# Patient Record
Sex: FEMALE | Race: BLACK OR AFRICAN AMERICAN | NOT HISPANIC OR LATINO | Employment: FULL TIME | URBAN - METROPOLITAN AREA
[De-identification: names, ages, dates, MRNs, and addresses within clinical notes are randomized per-mention and may not be internally consistent; named-entity substitution may affect disease eponyms.]

---

## 2018-08-08 ENCOUNTER — OFFICE VISIT (OUTPATIENT)
Dept: OBSTETRICS AND GYNECOLOGY | Facility: CLINIC | Age: 28
End: 2018-08-08
Payer: COMMERCIAL

## 2018-08-08 VITALS
DIASTOLIC BLOOD PRESSURE: 78 MMHG | BODY MASS INDEX: 20.66 KG/M2 | SYSTOLIC BLOOD PRESSURE: 121 MMHG | HEIGHT: 64 IN | WEIGHT: 121 LBS

## 2018-08-08 DIAGNOSIS — Z11.3 SCREEN FOR STD (SEXUALLY TRANSMITTED DISEASE): ICD-10-CM

## 2018-08-08 DIAGNOSIS — Z01.419 ENCOUNTER FOR WELL WOMAN EXAM WITH ROUTINE GYNECOLOGICAL EXAM: Primary | ICD-10-CM

## 2018-08-08 DIAGNOSIS — Z80.3 FAMILY HISTORY OF BREAST CANCER IN FEMALE: ICD-10-CM

## 2018-08-08 DIAGNOSIS — Z01.411 ENCOUNTER FOR WELL WOMAN EXAM WITH ABNORMAL FINDINGS: ICD-10-CM

## 2018-08-08 DIAGNOSIS — Z12.4 ENCOUNTER FOR SCREENING FOR CERVICAL CANCER: ICD-10-CM

## 2018-08-08 PROCEDURE — 87491 CHLMYD TRACH DNA AMP PROBE: CPT

## 2018-08-08 PROCEDURE — 99385 PREV VISIT NEW AGE 18-39: CPT | Mod: S$GLB,,, | Performed by: OBSTETRICS & GYNECOLOGY

## 2018-08-08 PROCEDURE — 87660 TRICHOMONAS VAGIN DIR PROBE: CPT

## 2018-08-08 PROCEDURE — 88175 CYTOPATH C/V AUTO FLUID REDO: CPT

## 2018-08-08 PROCEDURE — 99999 PR PBB SHADOW E&M-NEW PATIENT-LVL III: CPT | Mod: PBBFAC,,, | Performed by: OBSTETRICS & GYNECOLOGY

## 2018-08-08 NOTE — PROGRESS NOTES
SUBJECTIVE:   Karlee Bennett is a 28 y.o. female   for annual well woman exam. Patient's last menstrual period was 07/15/2018 (exact date)..    Pt has a few concerns:  -pt with multiple paternal aunt/uncle dies at age 30s due to SCD complications  -has two maternal aunts with breast cancer @ age 45, not sure if genetic testing was done  -tried to conceive for one month, but relationship was ended. Has questions regarding fertility testing and eggs freezing. She is not currently in a relationship    Contraception: none    History reviewed. No pertinent past medical history.  History reviewed. No pertinent surgical history.  Social History     Social History    Marital status: Single     Spouse name: N/A    Number of children: N/A    Years of education: N/A     Occupational History    Not on file.     Social History Main Topics    Smoking status: Never Smoker    Smokeless tobacco: Never Used    Alcohol use No    Drug use: No    Sexual activity: Yes     Partners: Male     Other Topics Concern    Not on file     Social History Narrative    No narrative on file     Family History   Problem Relation Age of Onset    Breast cancer Maternal Aunt 45    Colon cancer Maternal Grandfather 55    Breast cancer Maternal Aunt 45     OB History    Para Term  AB Living   0 0 0 0 0 0   SAB TAB Ectopic Multiple Live Births   0 0 0 0 0         Obstetric Comments   14/Reg   Denies std   Denies abnl pap         No current outpatient prescriptions on file.     No current facility-administered medications for this visit.      Allergies: Tetracyclines       ROS:  GENERAL: Denies weight gain or weight loss. Feeling well overall.   SKIN: Denies rash or lesions.   HEAD: Denies head injury or headache.   NODES: Denies enlarged lymph nodes.   CHEST: Denies chest pain or shortness of breath.   CARDIOVASCULAR: Denies palpitations or left sided chest pain.   ABDOMEN: No abdominal pain, constipation, diarrhea, nausea,  "vomiting or rectal bleeding.   URINARY: No frequency, dysuria, hematuria, or burning on urination.  REPRODUCTIVE: Denies vaginal discharge, abnormal vaginal bleeding, lesions, pelvic pain  BREASTS: The patient performs breast self-examination and denies pain, lumps, or nipple discharge.   HEMATOLOGIC: No easy bruisability or excessive bleeding.  MUSCULOSKELETAL: Denies joint pain or swelling.   NEUROLOGIC: Denies syncope or weakness.   PSYCHIATRIC: Denies depression, anxiety or mood swings.      OBJECTIVE:   /78   Ht 5' 4" (1.626 m)   Wt 54.9 kg (121 lb)   LMP 07/15/2018 (Exact Date)   BMI 20.77 kg/m²   The patient appears well, alert, oriented x 3, in no distress.  PSYCH:  Normal, full range of affect  NECK: negative, no thyromegaly, trachea midline  SKIN: normal, good color, good turgor and no acne, striae, hirsutism  BREAST EXAM: breasts appear normal, no suspicious masses, no skin or nipple changes or axillary nodes  ABDOMEN: soft, non-tender; bowel sounds normal; no masses,  no organomegaly and no hernias, masses, or hepatosplenomegaly  GENITALIA: normal external genitalia, no erythema, no discharge  BLADDER: soft  URETHRA: normal appearing urethra with no masses, tenderness or lesions and normal urethra, normal urethral meatus  VAGINA: Normal  CERVIX: no lesions or cervical motion tenderness  UTERUS: normal size, contour, position, consistency, mobility, non-tender  ADNEXA: no mass, fullness, tenderness      ASSESSMENT:   1. Health maintenance  -pap done. Discussed ASCCP guideline screening every 3 - 5 years.   -screening MMG recommend @ age 35 due to FH of breast cancer  -counseled on exercise and healthy diet  -bone health:  Discussed Vitamin D and Calcium supplementation, weight bearing exercises  2.  FH of breast cancer:  Advised pt to ask if maternal aunt had genetic testing first, best to test members with breast cancer  3.  Discussed fertility testing, infertility test is not indicated at this " time.  Discussed what considers AMA, risks of complications.  All questions are answered to her satisfaction.  Recommend PNV one month before trying to conceive.

## 2018-08-09 LAB
C TRACH DNA SPEC QL NAA+PROBE: NOT DETECTED
CANDIDA RRNA VAG QL PROBE: NEGATIVE
G VAGINALIS RRNA GENITAL QL PROBE: NEGATIVE
N GONORRHOEA DNA SPEC QL NAA+PROBE: NOT DETECTED
T VAGINALIS RRNA GENITAL QL PROBE: NEGATIVE

## 2018-08-10 ENCOUNTER — TELEPHONE (OUTPATIENT)
Dept: OBSTETRICS AND GYNECOLOGY | Facility: CLINIC | Age: 28
End: 2018-08-10

## 2018-08-10 NOTE — TELEPHONE ENCOUNTER
Notes recorded by Elizabeth Sanders MA on 8/10/2018 at 8:43 AM CDT  Spoke with patient in regards to results. Patient expressed understanding.

## 2018-08-10 NOTE — TELEPHONE ENCOUNTER
----- Message from Miranda Riggs MD sent at 8/10/2018  8:36 AM CDT -----  Please inform pt vaginal cultures are negative.

## 2018-12-04 ENCOUNTER — OFFICE VISIT (OUTPATIENT)
Dept: OBSTETRICS AND GYNECOLOGY | Facility: CLINIC | Age: 28
End: 2018-12-04
Payer: COMMERCIAL

## 2018-12-04 VITALS
HEIGHT: 64 IN | SYSTOLIC BLOOD PRESSURE: 126 MMHG | DIASTOLIC BLOOD PRESSURE: 74 MMHG | BODY MASS INDEX: 21.22 KG/M2 | WEIGHT: 124.31 LBS

## 2018-12-04 DIAGNOSIS — Z30.09 COUNSELING FOR BIRTH CONTROL REGARDING INTRAUTERINE DEVICE (IUD): Primary | ICD-10-CM

## 2018-12-04 PROCEDURE — 99213 OFFICE O/P EST LOW 20 MIN: CPT | Mod: S$GLB,,, | Performed by: NURSE PRACTITIONER

## 2018-12-04 PROCEDURE — 3008F BODY MASS INDEX DOCD: CPT | Mod: CPTII,S$GLB,, | Performed by: NURSE PRACTITIONER

## 2018-12-04 PROCEDURE — 99999 PR PBB SHADOW E&M-EST. PATIENT-LVL III: CPT | Mod: PBBFAC,,, | Performed by: NURSE PRACTITIONER

## 2018-12-04 RX ORDER — MISOPROSTOL 200 UG/1
200 TABLET ORAL DAILY
Qty: 2 TABLET | Refills: 0 | Status: SHIPPED | OUTPATIENT
Start: 2018-12-04 | End: 2019-01-31

## 2018-12-04 NOTE — PROGRESS NOTES
CC: Contraception    HPI: Pt is a 28 y.o.  female who presents to discuss contraception.  Reports she was non compliant with OCPs and stopped Depo d/t side effects.  She is interested in Kyleena IUD.   She is a non- smoker.  Denies history of HTN, blood clots, or stroke.        ROS:  GENERAL: Feeling well overall. Denies fever or chills.   SKIN: Denies rash or lesions.   HEAD: Denies head injury or headache.   NODES: Denies enlarged lymph nodes.   CHEST: Denies chest pain or shortness of breath.   CARDIOVASCULAR: Denies palpitations or left sided chest pain.   ABDOMEN: No abdominal pain, constipation, diarrhea, nausea, vomiting or rectal bleeding.   URINARY: No dysuria, hematuria, or burning on urination.  REPRODUCTIVE: See HPI.   BREASTS: Denies pain, lumps, or nipple discharge.   HEMATOLOGIC: No easy bruisability or excessive bleeding.   MUSCULOSKELETAL: Denies joint pain or swelling.   NEUROLOGIC: Denies syncope or weakness.   PSYCHIATRIC: Denies depression, anxiety or mood swings.    PE:   APPEARANCE: Well nourished, well developed, Black or  female in no acute distress.  VULVA: No lesions. Normal external female genitalia.  URETHRAL MEATUS: Normal size and location, no lesions, no prolapse.  URETHRA: No masses, tenderness, or prolapse.  VAGINA: Moist. No lesions or lacerations noted. No abnormal discharge present. No odor present.   CERVIX: No lesions or discharge. No cervical motion tenderness.   UTERUS: Normal size, regular shape, mobile, non-tender.  ADNEXA: No tenderness. No fullness or masses palpated in the adnexal regions.   ANUS PERINEUM: Normal.      Diagnosis:  1. Counseling for birth control regarding intrauterine device (IUD)        Plan:   Discussed R/B/A Kyleena IUD   benefits investigation initiated for Kyleena IUD  Cytotec for IUD insertion   Discussed antiinflammatory 45 min before procedure       Orders Placed This Encounter    Device Authorization Order    miSOPROStol  (CYTOTEC) 200 MCG Tab    levonorgestrel (KYLEENA) 17.5 mcg/24 hr (5 years) IUD         Follow-up PRN no resolution of symptoms.    Shahla Hernandez, MANOJP-C

## 2018-12-05 ENCOUNTER — TELEPHONE (OUTPATIENT)
Dept: OBSTETRICS AND GYNECOLOGY | Facility: CLINIC | Age: 28
End: 2018-12-05

## 2018-12-10 ENCOUNTER — TELEPHONE (OUTPATIENT)
Dept: OBSTETRICS AND GYNECOLOGY | Facility: CLINIC | Age: 28
End: 2018-12-10

## 2018-12-11 ENCOUNTER — TELEPHONE (OUTPATIENT)
Dept: PHARMACY | Facility: CLINIC | Age: 28
End: 2018-12-11

## 2018-12-11 NOTE — TELEPHONE ENCOUNTER
Pt reached regarding KYHUEMRA. Explained that she has a $0.00 copy for SUZANNE on her pharmacy benefit but she may have a copay for her office visit. She declined pharmacist consultation. She does NOT have an appointment for insertion. Will contact office to confirm delivery with [facility] staff.

## 2018-12-18 ENCOUNTER — TELEPHONE (OUTPATIENT)
Dept: OBSTETRICS AND GYNECOLOGY | Facility: CLINIC | Age: 28
End: 2018-12-18

## 2018-12-20 ENCOUNTER — LAB VISIT (OUTPATIENT)
Dept: LAB | Facility: OTHER | Age: 28
End: 2018-12-20
Attending: OBSTETRICS & GYNECOLOGY
Payer: COMMERCIAL

## 2018-12-20 ENCOUNTER — PROCEDURE VISIT (OUTPATIENT)
Dept: OBSTETRICS AND GYNECOLOGY | Facility: CLINIC | Age: 28
End: 2018-12-20
Payer: COMMERCIAL

## 2018-12-20 VITALS — HEIGHT: 64 IN | WEIGHT: 124.31 LBS | BODY MASS INDEX: 21.22 KG/M2

## 2018-12-20 DIAGNOSIS — Z83.2 FAMILY HISTORY OF SICKLE CELL TRAIT IN FATHER: ICD-10-CM

## 2018-12-20 DIAGNOSIS — Z30.9 ENCOUNTER FOR CONTRACEPTIVE MANAGEMENT, UNSPECIFIED TYPE: Primary | ICD-10-CM

## 2018-12-20 PROCEDURE — 36415 COLL VENOUS BLD VENIPUNCTURE: CPT

## 2018-12-20 PROCEDURE — 83021 HEMOGLOBIN CHROMOTOGRAPHY: CPT

## 2018-12-20 PROCEDURE — 83020 HEMOGLOBIN ELECTROPHORESIS: CPT | Mod: 91

## 2018-12-21 LAB
HGB A2 MFR BLD HPLC: 3.6 %
HGB FRACT BLD ELPH-IMP: ABNORMAL
HGB FRACT BLD ELPH-IMP: ABNORMAL

## 2018-12-21 PROCEDURE — 58300 INSERT INTRAUTERINE DEVICE: CPT | Mod: S$GLB,,, | Performed by: OBSTETRICS & GYNECOLOGY

## 2018-12-26 LAB — HGB FRACT BLD ELPH PH6.0-IMP: NORMAL

## 2018-12-27 ENCOUNTER — PATIENT MESSAGE (OUTPATIENT)
Dept: OBSTETRICS AND GYNECOLOGY | Facility: CLINIC | Age: 28
End: 2018-12-27

## 2019-01-07 ENCOUNTER — TELEPHONE (OUTPATIENT)
Dept: OBSTETRICS AND GYNECOLOGY | Facility: CLINIC | Age: 29
End: 2019-01-07

## 2019-01-07 NOTE — TELEPHONE ENCOUNTER
lvm explaining the following     Karlee,     Your labs do indicate that you carry the sickle cell trait. Due to the fact that your partner is also a sickle cell carrier, your offspring with him would have a 25% chance of having sickle cell disease. In order to avoid this risk completely, you and your partner would need to consider IVF with preimplantation genetic testing.   If this is the route that you wish to go in the future, I recommend that you see a Reproductive Endocrinologist. I recommend Dr. Karey Fay or Dr. Joya Graff at [facility].     Please let me know if you have questions.

## 2019-01-17 ENCOUNTER — OFFICE VISIT (OUTPATIENT)
Dept: OBSTETRICS AND GYNECOLOGY | Facility: CLINIC | Age: 29
End: 2019-01-17
Payer: COMMERCIAL

## 2019-01-17 VITALS — HEIGHT: 64 IN | BODY MASS INDEX: 21.07 KG/M2 | WEIGHT: 123.44 LBS

## 2019-01-17 DIAGNOSIS — Z31.69 ENCOUNTER FOR PRECONCEPTION CONSULTATION: ICD-10-CM

## 2019-01-17 DIAGNOSIS — Z30.431 ENCOUNTER FOR ROUTINE CHECKING OF INTRAUTERINE CONTRACEPTIVE DEVICE (IUD): Primary | ICD-10-CM

## 2019-01-17 PROCEDURE — 3008F BODY MASS INDEX DOCD: CPT | Mod: CPTII,S$GLB,, | Performed by: OBSTETRICS & GYNECOLOGY

## 2019-01-17 PROCEDURE — 99213 OFFICE O/P EST LOW 20 MIN: CPT | Mod: S$GLB,,, | Performed by: OBSTETRICS & GYNECOLOGY

## 2019-01-17 PROCEDURE — 3008F PR BODY MASS INDEX (BMI) DOCUMENTED: ICD-10-PCS | Mod: CPTII,S$GLB,, | Performed by: OBSTETRICS & GYNECOLOGY

## 2019-01-17 PROCEDURE — 99213 PR OFFICE/OUTPT VISIT, EST, LEVL III, 20-29 MIN: ICD-10-PCS | Mod: S$GLB,,, | Performed by: OBSTETRICS & GYNECOLOGY

## 2019-01-17 PROCEDURE — 99999 PR PBB SHADOW E&M-EST. PATIENT-LVL II: ICD-10-PCS | Mod: PBBFAC,,, | Performed by: OBSTETRICS & GYNECOLOGY

## 2019-01-17 PROCEDURE — 99999 PR PBB SHADOW E&M-EST. PATIENT-LVL II: CPT | Mod: PBBFAC,,, | Performed by: OBSTETRICS & GYNECOLOGY

## 2019-01-21 NOTE — PROGRESS NOTES
Subjective:       Patient ID: Karlee Bennett is a 28 y.o. female.    Chief Complaint:  IUD Check    History of Present Illness:  Gynecologic Exam   The patient's primary symptoms include vaginal bleeding and vaginal discharge. The patient's pertinent negatives include no genital itching, genital lesions, genital odor, genital rash, missed menses or pelvic pain. This is a new problem. The current episode started in the past 7 days. The problem occurs rarely. The problem has been unchanged. The pain is mild. The problem affects both sides. She is not pregnant. Pertinent negatives include no abdominal pain, anorexia, back pain, chills, constipation, diarrhea, discolored urine, dysuria, fever, flank pain, frequency, headaches, hematuria, nausea, painful intercourse, rash, urgency or vomiting. The vaginal discharge was brown. The vaginal bleeding is spotting. She has not been passing clots. She has not been passing tissue. Nothing aggravates the symptoms. She has tried nothing for the symptoms. The treatment provided no relief. She is sexually active. No, her partner does not have an STD. She uses an IUD for contraception. Her menstrual history has been regular. There is no history of an abdominal surgery, a  section, an ectopic pregnancy, endometriosis, a gynecological surgery, herpes simplex, menorrhagia, metrorrhagia, miscarriage, ovarian cysts, perineal abscess, PID, an STD, a terminated pregnancy or vaginosis.     29 y/o  presents for IUD check. She states that she had some spotting after placement, but bleeding has stopped. Denies cramping.     GYN & OB History  Patient's last menstrual period was 2019.   Date of Last Pap: 8/10/2018    OB History    Para Term  AB Living   0 0 0 0 0 0   SAB TAB Ectopic Multiple Live Births   0 0 0 0 0         Obstetric Comments   14/Reg   Denies std   Denies abnl pap       Review of Systems  Review of Systems   Constitutional: Negative for chills  and fever.   Gastrointestinal: Negative for abdominal pain, anorexia, constipation, diarrhea, nausea and vomiting.   Genitourinary: Positive for vaginal bleeding and vaginal discharge. Negative for dysuria, flank pain, frequency, hematuria, menorrhagia, missed menses, pelvic pain and urgency.   Musculoskeletal: Negative for back pain and myalgias.   Neurological: Negative for headaches.   Psychiatric/Behavioral: Negative for depression. The patient is not nervous/anxious.            Objective:    Physical Exam:   Constitutional: She is oriented to person, place, and time. She appears well-developed and well-nourished. No distress.    HENT:   Head: Normocephalic and atraumatic.    Eyes: EOM are normal.    Neck: Normal range of motion.     Pulmonary/Chest: Effort normal.          Genitourinary:   Genitourinary Comments: Normal external female genitalia; vagina rugated, normal; cervix normal, no masses, IUD strings in place.            Musculoskeletal: Normal range of motion and moves all extremeties.       Neurological: She is alert and oriented to person, place, and time.    Skin: Skin is warm. No rash noted.    Psychiatric: She has a normal mood and affect. Her behavior is normal. Judgment and thought content normal.          Assessment:        1. Encounter for routine checking of intrauterine contraceptive device (IUD)    2. Encounter for preconception consultation             Plan:      Karlee was seen today for iud check.    Diagnoses and all orders for this visit:    Encounter for routine checking of intrauterine contraceptive device (IUD)  - IUD strings in place.   - Counseled that if AUB occurs, can consider oral estrogen therapy.    Encounter for preconception consultation  - Discussed SS trait. Her partner has SS trait as well. They are not interested in conceiving at this time. Counseled that IVF with PGD would be the only way to avoid this entirely. She expressed understanding.     No orders of the defined  types were placed in this encounter.      Follow-up if symptoms worsen or fail to improve.

## 2019-01-30 DIAGNOSIS — Z97.5 IUD CONTRACEPTION: Primary | ICD-10-CM

## 2019-01-30 NOTE — TELEPHONE ENCOUNTER
Spoke with pt about removing iud, ststes she wants IUD ASAP wants paragard but another day. appt made 1/31 at 9:00am  Ordered placed for paragard

## 2019-01-30 NOTE — TELEPHONE ENCOUNTER
----- Message from Michelle Alvarado sent at 1/30/2019 11:57 AM CST -----  Contact: pt   Name of Who is Calling: STACI GALLEGO [[id number]]       What is the request in detail:   Patient is requesting a call in regards to removing her IUD she states she would like it to be removed within a week because she's having complications...Please contact to further discuss and advise       Can the clinic reply by [facility]: no       What [facility] Back [facility]:  [phone]

## 2019-01-30 NOTE — TELEPHONE ENCOUNTER
----- Message from Michelle Alvarado sent at 1/30/2019 11:57 AM CST -----  Contact: pt   Name of Who is Calling: STACI GALLEGO [[id number]]       What is the request in detail:   Patient is requesting a call in regards to removing her IUD she states she would like it to be removed within a week because she's having complications...Please contact to further discuss and advise       Can the clinic reply by [facility]: no       [facility]:  [phone]

## 2019-01-31 ENCOUNTER — PROCEDURE VISIT (OUTPATIENT)
Dept: OBSTETRICS AND GYNECOLOGY | Facility: CLINIC | Age: 29
End: 2019-01-31
Payer: COMMERCIAL

## 2019-01-31 VITALS — BODY MASS INDEX: 21.46 KG/M2 | WEIGHT: 125.69 LBS | HEIGHT: 64 IN

## 2019-01-31 DIAGNOSIS — Z30.432 ENCOUNTER FOR REMOVAL OF INTRAUTERINE CONTRACEPTIVE DEVICE (IUD): Primary | ICD-10-CM

## 2019-01-31 PROCEDURE — 58301 PR REMOVE, INTRAUTERINE DEVICE: ICD-10-PCS | Mod: S$GLB,,, | Performed by: OBSTETRICS & GYNECOLOGY

## 2019-01-31 PROCEDURE — 58301 REMOVE INTRAUTERINE DEVICE: CPT | Mod: S$GLB,,, | Performed by: OBSTETRICS & GYNECOLOGY

## 2019-01-31 RX ORDER — ADAPALENE AND BENZOYL PEROXIDE GEL, 0.1%/2.5% 1; 25 MG/G; MG/G
GEL TOPICAL
COMMUNITY
Start: 2019-01-29 | End: 2021-02-02

## 2019-01-31 RX ORDER — CLINDAMYCIN PHOSPHATE 10 MG/G
GEL TOPICAL
COMMUNITY
Start: 2019-01-30 | End: 2021-02-02

## 2019-01-31 RX ORDER — PROMETHAZINE HYDROCHLORIDE AND CODEINE PHOSPHATE 6.25; 1 MG/5ML; MG/5ML
SOLUTION ORAL
Refills: 0 | COMMUNITY
Start: 2019-01-11 | End: 2019-01-31

## 2019-01-31 NOTE — PROCEDURES
Procedures     DATE: 01/31/2019    TIME: 9:46 AM    PROCEDURE:  Kyleena removal    INDICATION: Karlee Bennett is a 28 y.o. female who presents for IUD removal secondary to acne. She desires non-hormonal Paragard IUD.     PRE-IUD REMOVAL COUNSELING:  The patient was advised of minimal risks of bleeding and pain and she agrees to proceed.    PROCEDURE:  TIME OUT PERFORMED.  IUD strings were  visualized at the os. IUD removed with gentle traction.  The patient tolerated the procedure well.    COMPLICATIONS: None    PATIENT DISPOSITION: The patient tolerated the procedure well.    ASSESSMENT:  Contraceptive Management / Removal IUD. V25.0.    POST IUD REMOVAL COUNSELING:  Expect period-like flow to occur after Mirena IUD removal and periods to return to pre-IUD pattern.  Manage post IUD removal cramping with NSAIDs, Tylenol or Rx per Med[employer].    POST IUD REMOVAL CONTRACEPTION:  If planning pregnancy, patient instructed to begin prenatal vitamins.     Counseling lasted approximately 15 minutes and all her questions were answered.    FOLLOW-UP: With me for IUD placement.    Michelle Alicia MD 01/31/2019 9:46 AM

## 2019-02-12 ENCOUNTER — TELEPHONE (OUTPATIENT)
Dept: OBSTETRICS AND GYNECOLOGY | Facility: CLINIC | Age: 29
End: 2019-02-12

## 2019-02-12 NOTE — TELEPHONE ENCOUNTER
Spoke with pt informed pt that her paragard is covered. Pt stated she will give the office a call back to schedule insertion. Pt verbalized understanding

## 2019-03-26 ENCOUNTER — OFFICE VISIT (OUTPATIENT)
Dept: OBSTETRICS AND GYNECOLOGY | Facility: CLINIC | Age: 29
End: 2019-03-26
Payer: COMMERCIAL

## 2019-03-26 VITALS
BODY MASS INDEX: 21.04 KG/M2 | WEIGHT: 123.25 LBS | HEIGHT: 64 IN | DIASTOLIC BLOOD PRESSURE: 84 MMHG | SYSTOLIC BLOOD PRESSURE: 122 MMHG

## 2019-03-26 DIAGNOSIS — Z30.9 ENCOUNTER FOR CONTRACEPTIVE MANAGEMENT, UNSPECIFIED TYPE: Primary | ICD-10-CM

## 2019-03-26 LAB
B-HCG UR QL: NEGATIVE
CTP QC/QA: YES

## 2019-03-26 PROCEDURE — 99999 PR PBB SHADOW E&M-EST. PATIENT-LVL III: ICD-10-PCS | Mod: PBBFAC,,, | Performed by: OBSTETRICS & GYNECOLOGY

## 2019-03-26 PROCEDURE — 99499 NO LOS: ICD-10-PCS | Mod: S$GLB,,, | Performed by: OBSTETRICS & GYNECOLOGY

## 2019-03-26 PROCEDURE — 99999 PR PBB SHADOW E&M-EST. PATIENT-LVL III: CPT | Mod: PBBFAC,,, | Performed by: OBSTETRICS & GYNECOLOGY

## 2019-03-26 PROCEDURE — 81025 URINE PREGNANCY TEST: CPT | Mod: S$GLB,,, | Performed by: OBSTETRICS & GYNECOLOGY

## 2019-03-26 PROCEDURE — 58300 INSERTION OF IUD: ICD-10-PCS | Mod: S$GLB,,, | Performed by: OBSTETRICS & GYNECOLOGY

## 2019-03-26 PROCEDURE — 58300 INSERT INTRAUTERINE DEVICE: CPT | Mod: S$GLB,,, | Performed by: OBSTETRICS & GYNECOLOGY

## 2019-03-26 PROCEDURE — 99499 UNLISTED E&M SERVICE: CPT | Mod: S$GLB,,, | Performed by: OBSTETRICS & GYNECOLOGY

## 2019-03-26 PROCEDURE — 81025 POCT URINE PREGNANCY: ICD-10-PCS | Mod: S$GLB,,, | Performed by: OBSTETRICS & GYNECOLOGY

## 2019-03-26 RX ORDER — SPIRONOLACTONE 100 MG/1
TABLET, FILM COATED ORAL
COMMUNITY
Start: 2019-03-18

## 2019-03-26 RX ORDER — TAZAROTENE 1 MG/G
CREAM TOPICAL
Refills: 3 | COMMUNITY
Start: 2019-01-31 | End: 2021-02-02

## 2019-03-26 RX ORDER — SULFAMETHOXAZOLE AND TRIMETHOPRIM 800; 160 MG/1; MG/1
1 TABLET ORAL DAILY
Refills: 2 | COMMUNITY
Start: 2019-01-30 | End: 2019-09-18

## 2019-03-26 NOTE — PROCEDURES
Insertion of IUD  Date/Time: 3/26/2019 10:33 AM  Performed by: Michelle Alicia MD  Authorized by: Michelle Alicia MD     Consent:     Consent obtained:  Written    Consent given by:  Patient    Procedure risks and benefits discussed: yes      Patient questions answered: yes      Patient agrees, verbalizes understanding, and wants to proceed: yes      Educational handouts given: no      Instructions and paperwork completed: yes    Procedure:     Pelvic exam performed: yes      Negative GC/chlamydia test: no      Negative urine pregnancy test: yes      Negative serum pregnancy test: no      Cervix cleaned and prepped: yes      Speculum placed in vagina: yes      Tenaculum applied to cervix: yes      Uterus sounded: yes      Uterus sound depth (cm):  8    IUD inserted with no complications: yes      IUD type:  ParaGard    Strings trimmed: yes    Post-procedure:     Patient tolerated procedure well: yes      Patient will follow up after next period: yes

## 2019-09-18 ENCOUNTER — OFFICE VISIT (OUTPATIENT)
Dept: OBSTETRICS AND GYNECOLOGY | Facility: CLINIC | Age: 29
End: 2019-09-18
Payer: COMMERCIAL

## 2019-09-18 VITALS
BODY MASS INDEX: 21.19 KG/M2 | DIASTOLIC BLOOD PRESSURE: 62 MMHG | WEIGHT: 123.44 LBS | SYSTOLIC BLOOD PRESSURE: 106 MMHG

## 2019-09-18 DIAGNOSIS — Z30.432 ENCOUNTER FOR IUD REMOVAL: Primary | ICD-10-CM

## 2019-09-18 DIAGNOSIS — L70.0 ACNE VULGARIS: ICD-10-CM

## 2019-09-18 DIAGNOSIS — Z30.9 ENCOUNTER FOR CONTRACEPTIVE MANAGEMENT, UNSPECIFIED TYPE: ICD-10-CM

## 2019-09-18 PROCEDURE — 99999 PR PBB SHADOW E&M-EST. PATIENT-LVL III: CPT | Mod: PBBFAC,,, | Performed by: NURSE PRACTITIONER

## 2019-09-18 PROCEDURE — 99499 UNLISTED E&M SERVICE: CPT | Mod: S$GLB,,, | Performed by: NURSE PRACTITIONER

## 2019-09-18 PROCEDURE — 58301 REMOVE INTRAUTERINE DEVICE: CPT | Mod: S$GLB,,, | Performed by: NURSE PRACTITIONER

## 2019-09-18 PROCEDURE — 99999 PR PBB SHADOW E&M-EST. PATIENT-LVL III: ICD-10-PCS | Mod: PBBFAC,,, | Performed by: NURSE PRACTITIONER

## 2019-09-18 PROCEDURE — 58301 PR REMOVE, INTRAUTERINE DEVICE: ICD-10-PCS | Mod: S$GLB,,, | Performed by: NURSE PRACTITIONER

## 2019-09-18 PROCEDURE — 99499 NO LOS: ICD-10-PCS | Mod: S$GLB,,, | Performed by: NURSE PRACTITIONER

## 2019-09-18 RX ORDER — DROSPIRENONE AND ETHINYL ESTRADIOL 0.03MG-3MG
1 KIT ORAL DAILY
Qty: 90 TABLET | Refills: 3 | Status: SHIPPED | OUTPATIENT
Start: 2019-09-18 | End: 2021-02-02

## 2019-09-18 NOTE — PROGRESS NOTES
PROCEDURE:     PRE-IUD REMOVAL COUNSELING:  The patient was advised of minimal risks of bleeding and pain and she agrees to proceed.    PROCEDURE:  TIME OUT PERFORMED.  IUD strings were visualized at the os and grasped. IUD removed with gentle traction.  The patient tolerated the procedure well      POST IUD REMOVAL COUNSELING:  Expect period-like flow to occur after Mirena IUD removal and periods to return to pre-IUD pattern.  Manage post IUD removal cramping with NSAIDs, Tylenol or Rx per [employer].    POST IUD REMOVAL CONTRACEPTION:[Post IUD removal contraception-OCPs]    Counseling lasted approximately 15 minutes and all her questions were answered.    FOLLOW-UP: With me for annual gyn exam or prn.

## 2019-11-04 ENCOUNTER — OFFICE VISIT (OUTPATIENT)
Dept: OBSTETRICS AND GYNECOLOGY | Facility: CLINIC | Age: 29
End: 2019-11-04
Payer: COMMERCIAL

## 2019-11-04 VITALS
BODY MASS INDEX: 20.82 KG/M2 | WEIGHT: 121.94 LBS | HEIGHT: 64 IN | DIASTOLIC BLOOD PRESSURE: 86 MMHG | SYSTOLIC BLOOD PRESSURE: 118 MMHG

## 2019-11-04 DIAGNOSIS — K62.9 ANAL LESION: Primary | ICD-10-CM

## 2019-11-04 PROCEDURE — 99999 PR PBB SHADOW E&M-EST. PATIENT-LVL III: ICD-10-PCS | Mod: PBBFAC,,, | Performed by: STUDENT IN AN ORGANIZED HEALTH CARE EDUCATION/TRAINING PROGRAM

## 2019-11-04 PROCEDURE — 99999 PR PBB SHADOW E&M-EST. PATIENT-LVL III: CPT | Mod: PBBFAC,,, | Performed by: STUDENT IN AN ORGANIZED HEALTH CARE EDUCATION/TRAINING PROGRAM

## 2019-11-04 PROCEDURE — 3008F PR BODY MASS INDEX (BMI) DOCUMENTED: ICD-10-PCS | Mod: CPTII,S$GLB,, | Performed by: STUDENT IN AN ORGANIZED HEALTH CARE EDUCATION/TRAINING PROGRAM

## 2019-11-04 PROCEDURE — 3008F BODY MASS INDEX DOCD: CPT | Mod: CPTII,S$GLB,, | Performed by: STUDENT IN AN ORGANIZED HEALTH CARE EDUCATION/TRAINING PROGRAM

## 2019-11-04 PROCEDURE — 99213 OFFICE O/P EST LOW 20 MIN: CPT | Mod: S$GLB,,, | Performed by: STUDENT IN AN ORGANIZED HEALTH CARE EDUCATION/TRAINING PROGRAM

## 2019-11-04 PROCEDURE — 99213 PR OFFICE/OUTPT VISIT, EST, LEVL III, 20-29 MIN: ICD-10-PCS | Mod: S$GLB,,, | Performed by: STUDENT IN AN ORGANIZED HEALTH CARE EDUCATION/TRAINING PROGRAM

## 2019-11-04 NOTE — PROGRESS NOTES
History & Physical  Gynecology      SUBJECTIVE:     Chief Complaint: Mass (by anus, started about 6 months ago, (looks like a boil), no bleeding and no itching)       History of Present Illness:  Pt here today to discuss a perianal skin lesion. Noted it about 6 months ago. Waxes and wanes. Starting to become uncomfortable pressure when she has BM, not pain however. No bleeding/itching. No changes in hygiene products/regimens. One sex partner. BM each day, does not strain. Hasn't tried anything for it.      Review of patient's allergies indicates:   Allergen Reactions    Neosporin [benzalkonium chloride] Dermatitis    Tetracyclines        Past Medical History:   Diagnosis Date    Sickle cell trait      Past Surgical History:   Procedure Laterality Date    WISDOM TOOTH EXTRACTION       OB History        0    Para   0    Term   0       0    AB   0    Living   0       SAB   0    TAB   0    Ectopic   0    Multiple   0    Live Births   0           Obstetric Comments   14/Reg  Denies std  Denies abnl pap           Family History   Problem Relation Age of Onset    Breast cancer Maternal Aunt 45    Colon cancer Maternal Grandfather 55    Breast cancer Maternal Aunt 45     Social History     Tobacco Use    Smoking status: Never Smoker    Smokeless tobacco: Never Used   Substance Use Topics    Alcohol use: Yes    Drug use: No       Current Outpatient Medications   Medication Sig    adapalene (DIFFERIN TOP) Apply topically.    dapsone (ACZONE TOP) Apply topically.    drospirenone-ethinyl estradiol (LIZETT, 28,) 3-0.03 mg per tablet Take 1 tablet by mouth once daily.    spironolactone (ALDACTONE) 100 MG tablet     adapalene-benzoyl peroxide (EPIDUO) 0.1-2.5 % GlwP     clindamycin phosphate 1% (CLINDAGEL) 1 % gel     tazarotene (AVAGE) 0.1 % cream APPLY TO ACNE AREAS 3 NIGHTS WEEKLY OR AS TOLERATED     Current Facility-Administered Medications   Medication    copper  mm         Review  of Systems:  Review of Systems   Constitutional: Negative for activity change, appetite change, chills and fever.   Gastrointestinal: Negative for abdominal pain, blood in stool, constipation, diarrhea, nausea, vomiting and fecal incontinence.   Genitourinary: Negative for pelvic pain, vaginal discharge and vaginal pain.        OBJECTIVE:     Physical Exam:  Physical Exam   Constitutional: She is oriented to person, place, and time. She appears well-developed and well-nourished. No distress.   HENT:   Head: Normocephalic and atraumatic.   Eyes: Conjunctivae are normal.   Neck: Normal range of motion.   Cardiovascular: Normal rate.   Pulmonary/Chest: Effort normal.   Genitourinary:         Musculoskeletal: Normal range of motion.   Neurological: She is alert and oriented to person, place, and time.   Skin: Skin is warm and dry. She is not diaphoretic.   Psychiatric: She has a normal mood and affect.   Vitals reviewed.        ASSESSMENT:       ICD-10-CM ICD-9-CM    1. Anal lesion K62.9 569.49 Ambulatory consult to Gastroenterology          Plan:      Possibly EIC. Could not express today. Not consistent with abscess/hemorrhoid/keloid/wart.  Recommended TID warm compresses, hydration. Recommend colace as needed to ensure no straining for BM. GI referral sent. ED precautions reviewed.    Counseling time: 15 minutes    Danielle Romero

## 2019-11-13 ENCOUNTER — TELEPHONE (OUTPATIENT)
Dept: OBSTETRICS AND GYNECOLOGY | Facility: CLINIC | Age: 29
End: 2019-11-13

## 2019-11-27 ENCOUNTER — OFFICE VISIT (OUTPATIENT)
Dept: SURGERY | Facility: OTHER | Age: 29
End: 2019-11-27
Attending: STUDENT IN AN ORGANIZED HEALTH CARE EDUCATION/TRAINING PROGRAM
Payer: COMMERCIAL

## 2019-11-27 VITALS
BODY MASS INDEX: 20.78 KG/M2 | DIASTOLIC BLOOD PRESSURE: 78 MMHG | SYSTOLIC BLOOD PRESSURE: 110 MMHG | HEART RATE: 82 BPM | HEIGHT: 64 IN | WEIGHT: 121.69 LBS

## 2019-11-27 DIAGNOSIS — K64.9 HEMORRHOIDS, UNSPECIFIED HEMORRHOID TYPE: Primary | ICD-10-CM

## 2019-11-27 PROCEDURE — 3008F BODY MASS INDEX DOCD: CPT | Mod: CPTII,S$GLB,, | Performed by: COLON & RECTAL SURGERY

## 2019-11-27 PROCEDURE — 99203 OFFICE O/P NEW LOW 30 MIN: CPT | Mod: S$GLB,,, | Performed by: COLON & RECTAL SURGERY

## 2019-11-27 PROCEDURE — 99999 PR PBB SHADOW E&M-EST. PATIENT-LVL III: ICD-10-PCS | Mod: PBBFAC,,, | Performed by: COLON & RECTAL SURGERY

## 2019-11-27 PROCEDURE — 3008F PR BODY MASS INDEX (BMI) DOCUMENTED: ICD-10-PCS | Mod: CPTII,S$GLB,, | Performed by: COLON & RECTAL SURGERY

## 2019-11-27 PROCEDURE — 99203 PR OFFICE/OUTPT VISIT, NEW, LEVL III, 30-44 MIN: ICD-10-PCS | Mod: S$GLB,,, | Performed by: COLON & RECTAL SURGERY

## 2019-11-27 PROCEDURE — 99999 PR PBB SHADOW E&M-EST. PATIENT-LVL III: CPT | Mod: PBBFAC,,, | Performed by: COLON & RECTAL SURGERY

## 2019-11-27 NOTE — LETTER
November 28, 2019      Danielle Romero MD  [address]  [address]  [place] [address]           [facility] [address] [facility] [address] [address] [facility] [address]  [address], [address]  [place] [facility] [address]  Phone: [phone]  Fax: [phone]          Patient: Karlee Bennett   MR Number: [id number]   YOB: 1990   Date of Visit: 11/27/2019       Dear Dr. Danielle Romero:    Thank you for referring Karlee Bennett to me for evaluation. Attached you will find relevant portions of my assessment and plan of care.    If you have questions, please do not hesitate to call me. I look forward to following Karlee Bennett along with you.    Sincerely,    Nilsa Banuelos MD    Enclosure  CC:  No Recipients    If you would like to receive this communication electronically, please contact [email or web address] or [phone] to request more information on [facility] access.    For providers and/or their staff who would like to refer a patient to [facility], please contact us through our one-stop-shop provider referral line, [facility], at [phone].    If you feel you have received this communication in error or would no longer like to receive these types of communications, please e-mail [email or web address]

## 2019-11-28 NOTE — PROGRESS NOTES
"CRS Office Visit History and Physical    Referring Md:   Danielle Romero Md  [address]  [place] [address]    SUBJECTIVE:     Chief Complaint:  Anal cyst    History of Present Illness:  The patient is new patient to this practice.   Course is as follows:  Patient is a 29 y.o. female presents with what she describes as cysts or blisters on her anus.  These occur intermittently, and self-resolved.  No previous incision and drainage procedures.  They are not associated with any particular activity or time of the month.  She does think that they become larger if she has to strain or has more than 1 bowel movement in a day.  Has never had them in any other location.  No bleeding. No prior anorectal procedures.  She does not think they are present today.      Review of patient's allergies indicates:   Allergen Reactions    Neosporin [benzalkonium chloride] Dermatitis    Tetracyclines        Past Medical History:   Diagnosis Date    Sickle cell trait      Past Surgical History:   Procedure Laterality Date    WISDOM TOOTH EXTRACTION  2011     Family History   Problem Relation Age of Onset    Breast cancer Maternal Aunt 45    Colon cancer Maternal Grandfather 55    Breast cancer Maternal Aunt 45     Social History     Tobacco Use    Smoking status: Never Smoker    Smokeless tobacco: Never Used   Substance Use Topics    Alcohol use: Yes    Drug use: No        Review of Systems:  Review of Systems   Gastrointestinal: Negative for abdominal pain, blood in stool, constipation and diarrhea.   All other systems reviewed and are negative.      OBJECTIVE:     Vital Signs (Most Recent)  /78 (BP Location: Right arm, Patient Position: Sitting, BP Method: Large (Automatic))   Pulse 82   Ht 5' 4" (1.626 m)   Wt 55.2 kg (121 lb 11.1 oz)   BMI 20.89 kg/m²     Physical Exam:  General: Black or  female in no distress   Neuro: Alert and oriented x 4.  Moves all extremities.     HEENT: " No icterus.  Trachea midline  Respiratory: Respirations are even and unlabored  Cardiac: Regular rate  Extremities: Warm dry and intact  Skin: No rashes    Anorectal:   External exam:  Non-thrombosed external hemorrhoid tissue in the right anterior and left lateral position. She states that this tissue is the area that occasionally gets swollen and feels like a blister.  No evidence of perianal skin excoriation or fistula.  Digital exam revealed normal tone. No masses.       ASSESSMENT/PLAN:     29-year-old female with perianal lesion, occurs intermittently, not there today per patient. Description of symptoms and exam sound consistent with prolapse or thrombosed external hemorrhoids.  Discussed recommendation for limiting time on toilet, avoiding straining, daily fiber supplementation, increasing hydration.  Reviewed diagrams of hemorrhoids.  No indication for banding or surgery at this time.  Encourage patient to reschedule appointment when she is symptomatic for exam.    Nilsa Banuelos MD  Staff Surgeon, Colon and Rectal Surgery  [facility]

## 2020-06-29 ENCOUNTER — OFFICE VISIT (OUTPATIENT)
Dept: URGENT CARE | Facility: CLINIC | Age: 30
End: 2020-06-29
Payer: COMMERCIAL

## 2020-06-29 VITALS
HEIGHT: 64 IN | SYSTOLIC BLOOD PRESSURE: 117 MMHG | RESPIRATION RATE: 19 BRPM | BODY MASS INDEX: 20.89 KG/M2 | HEART RATE: 95 BPM | OXYGEN SATURATION: 95 % | DIASTOLIC BLOOD PRESSURE: 85 MMHG | TEMPERATURE: 98 F

## 2020-06-29 DIAGNOSIS — R21 RASH: Primary | ICD-10-CM

## 2020-06-29 PROCEDURE — 99203 OFFICE O/P NEW LOW 30 MIN: CPT | Mod: S$GLB,,, | Performed by: NURSE PRACTITIONER

## 2020-06-29 PROCEDURE — 99203 PR OFFICE/OUTPT VISIT, NEW, LEVL III, 30-44 MIN: ICD-10-PCS | Mod: S$GLB,,, | Performed by: NURSE PRACTITIONER

## 2020-06-29 RX ORDER — SULFAMETHOXAZOLE AND TRIMETHOPRIM 400; 80 MG/1; MG/1
1 TABLET ORAL
COMMUNITY
End: 2021-02-02

## 2020-06-29 RX ORDER — CEPHALEXIN 500 MG/1
1000 CAPSULE ORAL 2 TIMES DAILY
Qty: 28 CAPSULE | Refills: 0 | Status: SHIPPED | OUTPATIENT
Start: 2020-06-29 | End: 2020-07-06

## 2020-06-29 RX ORDER — TRIAMCINOLONE ACETONIDE 1 MG/G
OINTMENT TOPICAL 2 TIMES DAILY
Qty: 30 G | Refills: 0 | Status: SHIPPED | OUTPATIENT
Start: 2020-06-29 | End: 2021-02-02

## 2020-06-29 RX ORDER — ESCITALOPRAM OXALATE 10 MG/1
TABLET ORAL
COMMUNITY
Start: 2020-05-19 | End: 2021-02-02

## 2020-06-29 NOTE — PATIENT INSTRUCTIONS
Continue taking bactrim  Add keflex  Start topical steroid twice daily  Follow up with dermatologist if needed      Self-Care for Skin Rashes     Pat your skin dry. Do not rub.     When your skin reacts to a substance your body is sensitive to, it can cause a rash. You can treat most rashes at home by keeping the skin clean and dry. Many rashes may get better on their own within 2 to 3 days. You may need medical attention if your rash itches, drains, or hurts, particularly if the rash is getting worse.  What can cause a skin rash?  · Sun poisoning, caused by too much exposure to the sun  · An irritant or allergic reaction to a certain type of food, plant, or chemical, such as  shellfish, poison ivy, and or cleaning products  · An infection caused by a fungus (ringworm), virus (chickenpox), or bacteria (strep)  · Bites or infestation caused by insects or pests, such as ticks, lice, or mites  · Dry skin, which is often seen during the winter months and in older people  How can I control itching and skin damage?  · Take soothing lukewarm baths in a colloidal oatmeal product. You can buy this at the [employer].  · Do your best not to scratch. Clip fingernails short, especially in young children, to reduce skin damage if scratching does occur.  · Use moisturizing skin lotion instead of scratching your dry skin.  · Use sunscreen whenever going out into direct sun.  · Use only mild cleansing agents whenever possible.  · Wash with mild, nonirritating soap and warm water.  · Wear clothing that breathes, such as cotton shirts or canvas shoes.  · If fluid is seeping from the rash, cover it loosely with clean gauze to absorb the discharge.  · Many rashes are contagious. Prevent the rash from spreading to others by washing your hands often before or after touching others with any skin rash.  Use medicine  · Antihistamines such as diphenhydramine can help control itching. But use with caution because they can make you  drowsy.  · Using over-the-counter hydrocortisone cream on small rashes may help reduce swelling and itching  · Most over-the-counter antifungal medicines can treat athletes foot and many other fungal infections of the skin.  Check with your healthcare provider  Call your healthcare provider if:  · You were told that you have a fungal infection on your skin to make sure you have the correct type of medicine.  · You have questions or concerns about medicines or their side effects.     Call 911  Call 911 if either of these occur:  · Your tongue or lips start to swell  · You have difficulty breathing      Call your healthcare provider  Call your healthcare provider if any of these occur:  · Temperature of more than 101.0°F (38.3°C), or as directed  · Sore throat, a cough, or unusual fatigue  · Red, oozy, or painful rash gets worse. These are signs of infection.  · Rash covers your face, genitals, or most of your body  · Crusty sores or red rings that begin to spread  · You were exposed to someone who has a contagious rash, such as scabies or lice.  · Red bulls-eye rash with a white center (a sign of Lyme disease)  · You were told that you have resistant bacteria (MRSA) on your skin.   Date Last Reviewed: 5/12/2015  © 5130-8020 [employer]. [address] [place] [address], [place] [address]. All rights reserved. This information is not intended as a substitute for professional medical care. Always follow your healthcare professional's instructions.

## 2020-06-29 NOTE — PROGRESS NOTES
"Subjective:       Patient ID: Karlee Bennett is a 29 y.o. female.    Vitals:  height is 5' 4" (1.626 m). Her temperature is 98.3 °F (36.8 °C). Her blood pressure is 117/85 and her pulse is 95. Her respiration is 19 and oxygen saturation is 95%.     Chief Complaint: Rash    Pt c/o rash on both feet and left thigh that began one week ago. Lesions are very itchy so she originally thought they were mosquito bites but the lesions have spread since then. No fever, chills, cough, SOB, sore throat. She did a telehealth visit and was prescribed bactrim which she has been taking for 5 days with no improvement.    Rash  This is a new problem. The current episode started in the past 7 days. The problem is unchanged. The affected locations include the left foot, left upper leg and right foot. The rash is characterized by blistering, redness and itchiness. Pertinent negatives include no cough, fever or sore throat. The treatment provided no relief.       Constitution: Negative for chills and fever.   HENT: Negative for facial swelling and sore throat.    Neck: Negative for painful lymph nodes.   Eyes: Negative for eye itching and eyelid swelling.   Respiratory: Negative for cough.    Musculoskeletal: Negative for joint pain and joint swelling.   Skin: Positive for rash and erythema. Negative for color change, pale, wound, abrasion, laceration, lesion, skin thickening/induration, puncture wound, bruising, abscess, avulsion and hives.   Allergic/Immunologic: Negative for environmental allergies, immunocompromised state and hives.   Hematologic/Lymphatic: Negative for swollen lymph nodes.       Objective:      Physical Exam   Constitutional: She is oriented to person, place, and time. She appears well-developed.   HENT:   Head: Normocephalic and atraumatic. Head is without abrasion, without contusion and without laceration.   Right Ear: External ear normal.   Left Ear: External ear normal.   Nose: Nose normal.   Mouth/Throat: " Oropharynx is clear and moist and mucous membranes are normal.   Eyes: Pupils are equal, round, and reactive to light. Conjunctivae, EOM and lids are normal.   Neck: Trachea normal, full passive range of motion without pain and phonation normal. Neck supple.   Cardiovascular: Normal rate, regular rhythm and normal heart sounds.   Pulmonary/Chest: Effort normal and breath sounds normal. No stridor. No respiratory distress.   Musculoskeletal: Normal range of motion.        Feet:    Neurological: She is alert and oriented to person, place, and time.   Skin: Skin is warm, dry, intact, rash and pustular. Capillary refill takes less than 2 seconds. Lesions:  erythemaabrasion, burn, bruising and ecchymosis  Psychiatric: Her speech is normal and behavior is normal. Judgment and thought content normal.   Nursing note and vitals reviewed.        Assessment:       1. Rash        Plan:         Rash  -     cephALEXin (KEFLEX) 500 MG capsule; Take 2 capsules (1,000 mg total) by mouth 2 (two) times a day. for 7 days  Dispense: 28 capsule; Refill: 0  -     triamcinolone acetonide 0.1% (KENALOG) 0.1 % ointment; Apply topically 2 (two) times daily.  Dispense: 30 g; Refill: 0         Reviewed previous pertinent office visits, PMH, PSH, fam hx  Add keflex for streptococcal coverage in addition to bactrim  Start topical steroid  She has an appt with her dermatologist next week. Recommended she keep this appt.  Advised on return/follow-up precautions. Advised on ER precautions. Answered all patient questions. Patient verbalized understanding and voiced agreement with current treatment plan.    Patient Instructions     Continue taking bactrim  Add keflex  Start topical steroid twice daily  Follow up with dermatologist if needed      Self-Care for Skin Rashes     Pat your skin dry. Do not rub.     When your skin reacts to a substance your body is sensitive to, it can cause a rash. You can treat most rashes at home by keeping the skin clean  and dry. Many rashes may get better on their own within 2 to 3 days. You may need medical attention if your rash itches, drains, or hurts, particularly if the rash is getting worse.  What can cause a skin rash?  · Sun poisoning, caused by too much exposure to the sun  · An irritant or allergic reaction to a certain type of food, plant, or chemical, such as  shellfish, poison ivy, and or cleaning products  · An infection caused by a fungus (ringworm), virus (chickenpox), or bacteria (strep)  · Bites or infestation caused by insects or pests, such as ticks, lice, or mites  · Dry skin, which is often seen during the winter months and in older people  How can I control itching and skin damage?  · Take soothing lukewarm baths in a colloidal oatmeal product. You can buy this at the [employer].  · Do your best not to scratch. Clip fingernails short, especially in young children, to reduce skin damage if scratching does occur.  · Use moisturizing skin lotion instead of scratching your dry skin.  · Use sunscreen whenever going out into direct sun.  · Use only mild cleansing agents whenever possible.  · Wash with mild, nonirritating soap and warm water.  · Wear clothing that breathes, such as cotton shirts or canvas shoes.  · If fluid is seeping from the rash, cover it loosely with clean gauze to absorb the discharge.  · Many rashes are contagious. Prevent the rash from spreading to others by washing your hands often before or after touching others with any skin rash.  Use medicine  · Antihistamines such as diphenhydramine can help control itching. But use with caution because they can make you drowsy.  · Using over-the-counter hydrocortisone cream on small rashes may help reduce swelling and itching  · Most over-the-counter antifungal medicines can treat athletes foot and many other fungal infections of the skin.  Check with your healthcare provider  Call your healthcare provider if:  · You were told that you have a fungal  infection on your skin to make sure you have the correct type of medicine.  · You have questions or concerns about medicines or their side effects.     Call 911  Call 911 if either of these occur:  · Your tongue or lips start to swell  · You have difficulty breathing      Call your healthcare provider  Call your healthcare provider if any of these occur:  · Temperature of more than 101.0°F (38.3°C), or as directed  · Sore throat, a cough, or unusual fatigue  · Red, oozy, or painful rash gets worse. These are signs of infection.  · Rash covers your face, genitals, or most of your body  · Crusty sores or red rings that begin to spread  · You were exposed to someone who has a contagious rash, such as scabies or lice.  · Red bulls-eye rash with a white center (a sign of Lyme disease)  · You were told that you have resistant bacteria (MRSA) on your skin.   Date Last Reviewed: 5/12/2015  © 1377-4836 [employer]. [address] [place] [address], [place] [address]. All rights reserved. This information is not intended as a substitute for professional medical care. Always follow your healthcare professional's instructions.

## 2021-02-02 ENCOUNTER — OFFICE VISIT (OUTPATIENT)
Dept: OBSTETRICS AND GYNECOLOGY | Facility: CLINIC | Age: 31
End: 2021-02-02
Payer: COMMERCIAL

## 2021-02-02 VITALS — WEIGHT: 128.5 LBS | DIASTOLIC BLOOD PRESSURE: 80 MMHG | BODY MASS INDEX: 22.06 KG/M2 | SYSTOLIC BLOOD PRESSURE: 120 MMHG

## 2021-02-02 DIAGNOSIS — Z12.4 ENCOUNTER FOR PAPANICOLAOU SMEAR FOR CERVICAL CANCER SCREENING: ICD-10-CM

## 2021-02-02 DIAGNOSIS — Z30.9 ENCOUNTER FOR CONTRACEPTIVE MANAGEMENT, UNSPECIFIED TYPE: ICD-10-CM

## 2021-02-02 DIAGNOSIS — Z01.419 WOMEN'S ANNUAL ROUTINE GYNECOLOGICAL EXAMINATION: Primary | ICD-10-CM

## 2021-02-02 DIAGNOSIS — Z11.51 SCREENING FOR HPV (HUMAN PAPILLOMAVIRUS): ICD-10-CM

## 2021-02-02 PROCEDURE — 99999 PR PBB SHADOW E&M-EST. PATIENT-LVL II: CPT | Mod: PBBFAC,,, | Performed by: NURSE PRACTITIONER

## 2021-02-02 PROCEDURE — 99999 PR PBB SHADOW E&M-EST. PATIENT-LVL II: ICD-10-PCS | Mod: PBBFAC,,, | Performed by: NURSE PRACTITIONER

## 2021-02-02 PROCEDURE — 88175 CYTOPATH C/V AUTO FLUID REDO: CPT

## 2021-02-02 PROCEDURE — 87624 HPV HI-RISK TYP POOLED RSLT: CPT

## 2021-02-02 PROCEDURE — 99395 PREV VISIT EST AGE 18-39: CPT | Mod: S$GLB,,, | Performed by: NURSE PRACTITIONER

## 2021-02-02 PROCEDURE — 99395 PR PREVENTIVE VISIT,EST,18-39: ICD-10-PCS | Mod: S$GLB,,, | Performed by: NURSE PRACTITIONER

## 2021-02-02 RX ORDER — DAPSONE 75 MG/G
GEL TOPICAL
COMMUNITY
Start: 2021-02-01

## 2021-02-02 RX ORDER — LAMOTRIGINE 200 MG/1
TABLET ORAL
COMMUNITY
Start: 2020-12-31 | End: 2022-01-14

## 2021-02-02 RX ORDER — DROSPIRENONE AND ETHINYL ESTRADIOL TABLETS 0.02-3(28)
1 KIT ORAL DAILY
COMMUNITY
Start: 2020-12-27 | End: 2021-02-02 | Stop reason: SDUPTHER

## 2021-02-02 RX ORDER — QUETIAPINE FUMARATE 300 MG/1
300 TABLET, FILM COATED ORAL DAILY
COMMUNITY
Start: 2021-01-10 | End: 2022-01-14

## 2021-02-02 RX ORDER — DROSPIRENONE AND ETHINYL ESTRADIOL TABLETS 0.02-3(28)
1 KIT ORAL DAILY
Qty: 90 TABLET | Refills: 3 | Status: SHIPPED | OUTPATIENT
Start: 2021-02-02 | End: 2022-01-14

## 2021-02-09 LAB
HPV HR 12 DNA SPEC QL NAA+PROBE: POSITIVE
HPV16 AG SPEC QL: NEGATIVE
HPV18 DNA SPEC QL NAA+PROBE: NEGATIVE

## 2021-02-27 LAB
FINAL PATHOLOGIC DIAGNOSIS: NORMAL
Lab: NORMAL

## 2021-03-01 ENCOUNTER — PATIENT MESSAGE (OUTPATIENT)
Dept: OBSTETRICS AND GYNECOLOGY | Facility: CLINIC | Age: 31
End: 2021-03-01

## 2021-03-01 DIAGNOSIS — B97.7 HPV IN FEMALE: ICD-10-CM

## 2021-03-29 ENCOUNTER — IMMUNIZATION (OUTPATIENT)
Dept: PRIMARY CARE CLINIC | Facility: CLINIC | Age: 31
End: 2021-03-29
Payer: COMMERCIAL

## 2021-03-29 DIAGNOSIS — Z23 NEED FOR VACCINATION: Primary | ICD-10-CM

## 2021-03-29 PROCEDURE — 91301 PR SARS-COV-2 COVID-19 VACCINE, NO PRSV, 100MCG/0.5ML, IM: CPT | Mod: S$GLB,,, | Performed by: INTERNAL MEDICINE

## 2021-03-29 PROCEDURE — 0011A PR IMMUNIZ ADMIN, SARS-COV-2 COVID-19 VACC, 100MCG/0.5ML, 1ST DOSE: ICD-10-PCS | Mod: CV19,S$GLB,, | Performed by: INTERNAL MEDICINE

## 2021-03-29 PROCEDURE — 91301 PR SARS-COV-2 COVID-19 VACCINE, NO PRSV, 100MCG/0.5ML, IM: ICD-10-PCS | Mod: S$GLB,,, | Performed by: INTERNAL MEDICINE

## 2021-03-29 PROCEDURE — 0011A PR IMMUNIZ ADMIN, SARS-COV-2 COVID-19 VACC, 100MCG/0.5ML, 1ST DOSE: CPT | Mod: CV19,S$GLB,, | Performed by: INTERNAL MEDICINE

## 2021-03-29 RX ADMIN — Medication 0.5 ML: at 08:03

## 2021-04-28 ENCOUNTER — IMMUNIZATION (OUTPATIENT)
Dept: PRIMARY CARE CLINIC | Facility: CLINIC | Age: 31
End: 2021-04-28
Payer: COMMERCIAL

## 2021-04-28 DIAGNOSIS — Z23 NEED FOR VACCINATION: Primary | ICD-10-CM

## 2021-04-28 PROCEDURE — 91301 PR SARS-COV-2 COVID-19 VACCINE, NO PRSV, 100MCG/0.5ML, IM: ICD-10-PCS | Mod: S$GLB,,, | Performed by: INTERNAL MEDICINE

## 2021-04-28 PROCEDURE — 0012A PR IMMUNIZ ADMIN, SARS-COV-2 COVID-19 VACC, 100MCG/0.5ML, 2ND DOSE: ICD-10-PCS | Mod: CV19,S$GLB,, | Performed by: INTERNAL MEDICINE

## 2021-04-28 PROCEDURE — 0012A PR IMMUNIZ ADMIN, SARS-COV-2 COVID-19 VACC, 100MCG/0.5ML, 2ND DOSE: CPT | Mod: CV19,S$GLB,, | Performed by: INTERNAL MEDICINE

## 2021-04-28 PROCEDURE — 91301 PR SARS-COV-2 COVID-19 VACCINE, NO PRSV, 100MCG/0.5ML, IM: CPT | Mod: S$GLB,,, | Performed by: INTERNAL MEDICINE

## 2021-04-28 RX ADMIN — Medication 0.5 ML: at 08:04

## 2021-11-12 ENCOUNTER — IMMUNIZATION (OUTPATIENT)
Dept: PRIMARY CARE CLINIC | Facility: CLINIC | Age: 31
End: 2021-11-12
Payer: COMMERCIAL

## 2021-11-12 DIAGNOSIS — Z23 NEED FOR VACCINATION: Primary | ICD-10-CM

## 2021-11-12 PROCEDURE — 0064A COVID-19, MRNA, LNP-S, PF, 100 MCG/0.25 ML DOSE VACCINE (MODERNA BOOSTER): CPT | Mod: CV19,PBBFAC | Performed by: INTERNAL MEDICINE

## 2021-12-23 ENCOUNTER — HOSPITAL ENCOUNTER (EMERGENCY)
Facility: HOSPITAL | Age: 31
Discharge: HOME | End: 2021-12-23
Attending: EMERGENCY MEDICINE | Admitting: EMERGENCY MEDICINE
Payer: COMMERCIAL

## 2021-12-23 VITALS
WEIGHT: 130 LBS | HEART RATE: 104 BPM | BODY MASS INDEX: 22.2 KG/M2 | RESPIRATION RATE: 18 BRPM | DIASTOLIC BLOOD PRESSURE: 81 MMHG | SYSTOLIC BLOOD PRESSURE: 130 MMHG | OXYGEN SATURATION: 97 % | TEMPERATURE: 99 F | HEIGHT: 64 IN

## 2021-12-23 DIAGNOSIS — J02.9 ACUTE PHARYNGITIS, UNSPECIFIED ETIOLOGY: Primary | ICD-10-CM

## 2021-12-23 DIAGNOSIS — J03.90 TONSILLITIS: ICD-10-CM

## 2021-12-23 LAB
ALBUMIN SERPL BCP-MCNC: 4.8 G/DL (ref 3.5–5.2)
ALP SERPL-CCNC: 72 U/L (ref 55–135)
ALT SERPL W/O P-5'-P-CCNC: 9 U/L (ref 10–44)
ANION GAP SERPL CALC-SCNC: 16 MMOL/L (ref 8–16)
AST SERPL-CCNC: 15 U/L (ref 10–40)
B-HCG UR QL: NEGATIVE
BASOPHILS # BLD AUTO: 0.03 K/UL (ref 0–0.2)
BASOPHILS NFR BLD: 0.2 % (ref 0–1.9)
BILIRUB SERPL-MCNC: 1.2 MG/DL (ref 0.1–1)
BUN SERPL-MCNC: 7 MG/DL (ref 6–20)
CALCIUM SERPL-MCNC: 10.3 MG/DL (ref 8.7–10.5)
CHLORIDE SERPL-SCNC: 98 MMOL/L (ref 95–110)
CO2 SERPL-SCNC: 24 MMOL/L (ref 23–29)
CREAT SERPL-MCNC: 0.9 MG/DL (ref 0.5–1.4)
CTP QC/QA: YES
CTP QC/QA: YES
DIFFERENTIAL METHOD: ABNORMAL
EOSINOPHIL # BLD AUTO: 0 K/UL (ref 0–0.5)
EOSINOPHIL NFR BLD: 0 % (ref 0–8)
ERYTHROCYTE [DISTWIDTH] IN BLOOD BY AUTOMATED COUNT: 12.1 % (ref 11.5–14.5)
EST. GFR  (AFRICAN AMERICAN): >60 ML/MIN/1.73 M^2
EST. GFR  (NON AFRICAN AMERICAN): >60 ML/MIN/1.73 M^2
GLUCOSE SERPL-MCNC: 101 MG/DL (ref 70–110)
GROUP A STREP, MOLECULAR: NEGATIVE
HCT VFR BLD AUTO: 37.1 % (ref 37–48.5)
HETEROPH AB SERPL QL IA: NEGATIVE
HGB BLD-MCNC: 13.2 G/DL (ref 12–16)
IMM GRANULOCYTES # BLD AUTO: 0.12 K/UL (ref 0–0.04)
IMM GRANULOCYTES NFR BLD AUTO: 0.6 % (ref 0–0.5)
LACTATE SERPL-SCNC: 1.1 MMOL/L (ref 0.5–2.2)
LYMPHOCYTES # BLD AUTO: 0.5 K/UL (ref 1–4.8)
LYMPHOCYTES NFR BLD: 2.6 % (ref 18–48)
MCH RBC QN AUTO: 29.5 PG (ref 27–31)
MCHC RBC AUTO-ENTMCNC: 35.6 G/DL (ref 32–36)
MCV RBC AUTO: 83 FL (ref 82–98)
MONOCYTES # BLD AUTO: 0.7 K/UL (ref 0.3–1)
MONOCYTES NFR BLD: 3.6 % (ref 4–15)
NEUTROPHILS # BLD AUTO: 18.2 K/UL (ref 1.8–7.7)
NEUTROPHILS NFR BLD: 93 % (ref 38–73)
NRBC BLD-RTO: 0 /100 WBC
PLATELET # BLD AUTO: 249 K/UL (ref 150–450)
PMV BLD AUTO: 9.3 FL (ref 9.2–12.9)
POTASSIUM SERPL-SCNC: 3.9 MMOL/L (ref 3.5–5.1)
PROT SERPL-MCNC: 8.5 G/DL (ref 6–8.4)
RBC # BLD AUTO: 4.48 M/UL (ref 4–5.4)
SARS-COV-2 RDRP RESP QL NAA+PROBE: NEGATIVE
SODIUM SERPL-SCNC: 138 MMOL/L (ref 136–145)
WBC # BLD AUTO: 19.53 K/UL (ref 3.9–12.7)

## 2021-12-23 PROCEDURE — 96376 TX/PRO/DX INJ SAME DRUG ADON: CPT | Mod: 59

## 2021-12-23 PROCEDURE — 96365 THER/PROPH/DIAG IV INF INIT: CPT | Mod: 59

## 2021-12-23 PROCEDURE — 63600175 PHARM REV CODE 636 W HCPCS: Performed by: INTERNAL MEDICINE

## 2021-12-23 PROCEDURE — 96367 TX/PROPH/DG ADDL SEQ IV INF: CPT

## 2021-12-23 PROCEDURE — 80053 COMPREHEN METABOLIC PANEL: CPT | Performed by: PHYSICIAN ASSISTANT

## 2021-12-23 PROCEDURE — 63600175 PHARM REV CODE 636 W HCPCS: Performed by: EMERGENCY MEDICINE

## 2021-12-23 PROCEDURE — 87081 CULTURE SCREEN ONLY: CPT | Performed by: PHYSICIAN ASSISTANT

## 2021-12-23 PROCEDURE — 85025 COMPLETE CBC W/AUTO DIFF WBC: CPT | Performed by: PHYSICIAN ASSISTANT

## 2021-12-23 PROCEDURE — 87070 CULTURE OTHR SPECIMN AEROBIC: CPT | Performed by: PHYSICIAN ASSISTANT

## 2021-12-23 PROCEDURE — 81025 URINE PREGNANCY TEST: CPT | Performed by: PHYSICIAN ASSISTANT

## 2021-12-23 PROCEDURE — 86308 HETEROPHILE ANTIBODY SCREEN: CPT | Performed by: PHYSICIAN ASSISTANT

## 2021-12-23 PROCEDURE — 99291 CRITICAL CARE FIRST HOUR: CPT | Mod: 25

## 2021-12-23 PROCEDURE — 96375 TX/PRO/DX INJ NEW DRUG ADDON: CPT

## 2021-12-23 PROCEDURE — 25500020 PHARM REV CODE 255: Performed by: EMERGENCY MEDICINE

## 2021-12-23 PROCEDURE — U0002 COVID-19 LAB TEST NON-CDC: HCPCS | Performed by: PHYSICIAN ASSISTANT

## 2021-12-23 PROCEDURE — 83605 ASSAY OF LACTIC ACID: CPT | Performed by: PHYSICIAN ASSISTANT

## 2021-12-23 PROCEDURE — 25000003 PHARM REV CODE 250: Performed by: INTERNAL MEDICINE

## 2021-12-23 PROCEDURE — 63600175 PHARM REV CODE 636 W HCPCS: Performed by: PHYSICIAN ASSISTANT

## 2021-12-23 PROCEDURE — 96361 HYDRATE IV INFUSION ADD-ON: CPT

## 2021-12-23 PROCEDURE — 25000003 PHARM REV CODE 250: Performed by: EMERGENCY MEDICINE

## 2021-12-23 PROCEDURE — 87651 STREP A DNA AMP PROBE: CPT | Performed by: PHYSICIAN ASSISTANT

## 2021-12-23 RX ORDER — ACETAMINOPHEN 500 MG
1000 TABLET ORAL
Status: COMPLETED | OUTPATIENT
Start: 2021-12-23 | End: 2021-12-23

## 2021-12-23 RX ORDER — DEXAMETHASONE SODIUM PHOSPHATE 4 MG/ML
8 INJECTION, SOLUTION INTRA-ARTICULAR; INTRALESIONAL; INTRAMUSCULAR; INTRAVENOUS; SOFT TISSUE
Status: COMPLETED | OUTPATIENT
Start: 2021-12-23 | End: 2021-12-23

## 2021-12-23 RX ORDER — AMOXICILLIN AND CLAVULANATE POTASSIUM 875; 125 MG/1; MG/1
1 TABLET, FILM COATED ORAL 2 TIMES DAILY
Qty: 20 TABLET | Refills: 0 | Status: SHIPPED | OUTPATIENT
Start: 2021-12-23 | End: 2022-01-02

## 2021-12-23 RX ORDER — CLINDAMYCIN PHOSPHATE 600 MG/50ML
600 INJECTION, SOLUTION INTRAVENOUS
Status: COMPLETED | OUTPATIENT
Start: 2021-12-23 | End: 2021-12-23

## 2021-12-23 RX ORDER — ACETAMINOPHEN 500 MG
1000 TABLET ORAL
Status: DISCONTINUED | OUTPATIENT
Start: 2021-12-23 | End: 2021-12-23

## 2021-12-23 RX ORDER — DEXAMETHASONE SODIUM PHOSPHATE 4 MG/ML
4 INJECTION, SOLUTION INTRA-ARTICULAR; INTRALESIONAL; INTRAMUSCULAR; INTRAVENOUS; SOFT TISSUE
Status: COMPLETED | OUTPATIENT
Start: 2021-12-23 | End: 2021-12-23

## 2021-12-23 RX ORDER — METHYLPREDNISOLONE 4 MG/1
TABLET ORAL
Qty: 1 EACH | Refills: 0 | Status: SHIPPED | OUTPATIENT
Start: 2021-12-23 | End: 2022-01-13

## 2021-12-23 RX ADMIN — IOHEXOL 100 ML: 350 INJECTION, SOLUTION INTRAVENOUS at 06:12

## 2021-12-23 RX ADMIN — SODIUM CHLORIDE, SODIUM LACTATE, POTASSIUM CHLORIDE, AND CALCIUM CHLORIDE 1000 ML: .6; .31; .03; .02 INJECTION, SOLUTION INTRAVENOUS at 05:12

## 2021-12-23 RX ADMIN — DIPHENHYDRAMINE HYDROCHLORIDE 10 ML: 25 SOLUTION ORAL at 09:12

## 2021-12-23 RX ADMIN — ACETAMINOPHEN 1000 MG: 500 TABLET ORAL at 09:12

## 2021-12-23 RX ADMIN — CLINDAMYCIN PHOSPHATE 600 MG: 600 INJECTION, SOLUTION INTRAVENOUS at 06:12

## 2021-12-23 RX ADMIN — DEXAMETHASONE SODIUM PHOSPHATE 4 MG: 4 INJECTION INTRA-ARTICULAR; INTRALESIONAL; INTRAMUSCULAR; INTRAVENOUS; SOFT TISSUE at 08:12

## 2021-12-23 RX ADMIN — CEFTRIAXONE 1 G: 1 INJECTION, SOLUTION INTRAVENOUS at 04:12

## 2021-12-23 RX ADMIN — SODIUM CHLORIDE, SODIUM LACTATE, POTASSIUM CHLORIDE, AND CALCIUM CHLORIDE 1000 ML: .6; .31; .03; .02 INJECTION, SOLUTION INTRAVENOUS at 06:12

## 2021-12-23 RX ADMIN — DEXAMETHASONE SODIUM PHOSPHATE 8 MG: 4 INJECTION INTRA-ARTICULAR; INTRALESIONAL; INTRAMUSCULAR; INTRAVENOUS; SOFT TISSUE at 04:12

## 2021-12-23 NOTE — ED TRIAGE NOTES
Pt presents to  ED with c/o sore throat on 3 days ago worse last night and this AM. Pt has large tonsils with both tonsils with white coating. Pt is drooling due to pain when swallowing. Pt denies fevers, chills, cough, SOB.

## 2021-12-23 NOTE — ED PROVIDER NOTES
SCRIBE #1 NOTE: I, Annie Fernandes, am scribing for, and in the presence of, Bandar Harris DO.         Source of History:  Patient    Chief complaint:  Sore Throat (Pt c.o sore throat on 3 days ago worse last night and this AM.  Pt has large tonsils with both tonsils with white coating.  Pt is drooling in traige due to pain when swallowing.  AAO  x 3 nadn skin w.d )      HPI:  Karlee Bennett is a 31 y.o. female presenting with sore throat for 3 days. Pt reports that she could not sleep last night due to severe discomfort and has not eaten anything in 2 days. She notes dehydration and dysphagia. She denies rhinorrhea and cough. Pt has never had similar Sx in the past. She engaged in oral sexual activity recently but does not know if her partner had an STD; she does not have hx of STD's.    This is the extent to the patients complaints today here in the emergency department.    ROS: As per HPI and below:  Constitutional: No fever.  No chills. Positive for dehydration.  Eyes: No visual changes.  ENT: Positive for sore throat. Positive for dysphagia. Negative for rhinorrhea.  Cardiovascular: No chest pain.  Respiratory: No shortness of breath. Negative for cough.  GI: No abdominal pain.  No nausea or vomiting.  Genitourinary: No abnormal urination.  Neurologic: No headache. No focal weakness.  No numbness.  MSK: no back pain.  Integument: No rashes or lesions.  Hematologic: No easy bruising.  Endocrine: No excessive thirst or urination.    Review of patient's allergies indicates:   Allergen Reactions    Neosporin [benzalkonium chloride] Dermatitis    Tetracyclines        PMH:  As per HPI and below:  Past Medical History:   Diagnosis Date    Sickle cell trait      Past Surgical History:   Procedure Laterality Date    WISDOM TOOTH EXTRACTION  2011       Social History     Tobacco Use    Smoking status: Never Smoker    Smokeless tobacco: Never Used   Substance Use Topics    Alcohol use: Yes    Drug use: No  "      Physical Exam:    /81 (BP Location: Right arm, Patient Position: Lying)   Pulse 104   Temp 99.2 °F (37.3 °C) (Oral)   Resp 18   Ht 5' 4" (1.626 m)   Wt 59 kg (130 lb)   SpO2 97%   Breastfeeding No   BMI 22.31 kg/m²   Nursing note and vital signs reviewed.  Constitutional: No acute distress. Muffled voice. Non toxic appearing.  Eyes: No conjunctival injection.  Extraocular muscles are intact.  ENT: Trismus. Lymphadenopathy. Swollen bilateral tonsils with significant exudate and discharge. Uvula midline. No posterior oral pharyngeal elevation or deviation.  Cardiovascular: Regular rate and rhythm.  No murmurs. No gallops. No rubs  Respiratory: Clear to auscultation bilaterally.  Good air movement.  No wheezes.  No rhonchi. No rales. No accessory muscle use.  Abdomen: Soft.  Not distended.  Nontender.  No guarding.  No rebound. Non-peritoneal.  Musculoskeletal: Good range of motion all joints.  No deformities.  Neck supple.  No meningismus.  Skin: No rashes seen.  Good turgor.  No abrasions.  No ecchymoses.  Neuro: alert and oriented x3,  no focal neurological deficits.  Psych: Appropriate, conversant    Labs that have been ordered have been independently reviewed and interpreted by myself.    Critical Care    Date/Time: 12/23/2021 6:53 PM  Performed by: Bandar Harris DO  Authorized by: Bandar Harris DO   Direct patient critical care time: 45 minutes  Additional history critical care time: 1 minutes  Ordering / reviewing critical care time: 5 minutes  Documentation critical care time: 6 minutes  Consulting other physicians critical care time: 5 minutes  Total critical care time (exclusive of procedural time) : 62 minutes           I decided to obtain the patient's medical records.  Summary of Medical Records:      Initial Impression/ Differential Dx:  Group a strep, mono, gonococcal pharyngitis, diptheria no findings to suggest peritonsillar abscess, no tongue swelling to suggest " Naga's angina.    MDM:    31 y.o. female with severe exudative pharyngitis.  She has trismus as well as muffled voice.  Will get labs and observed.  High likelihood of observation and evaluation by ENT.  I feel she will need to be observe for antibiotics as well as for airway obstruction.    ED Course as of 12/23/21 2152   Thu Dec 23, 2021   1659 Preg Test, Ur: Negative [SM]   1713 Monospot: Negative [SM]   1713 Group A Strep, Molecular: Negative [SM]   1725 SARS-CoV-2 RNA, Amplification, Qual: Negative [SM]   1737 eGFR if : >60 [SM]   1737 Sodium: 138 [SM]   1737 Lactate, Nathanael: 1.1 [SM]   1808 WBC(!): 19.53 [SM]   1808 Hemoglobin: 13.2 [SM]   1830 Updated the patient on the results.  Pending results of CT scan.  Called the transfer center to discuss with ENT.  Currently no respiratory distress but still with muffled voice in the patient states that she feels that her throat is getting tighter. [SM]   1851 I spoke with Dr. Magallon with ENT at [facility].  After discussion patient to be transferred Emergency Department to emergency department for observation. [SM]   1937 CT Soft Tissue Neck With Contrast  CT scan shows bilateral tonsillitis with concern of developing small abscesses on the left tonsil. [SM]      ED Course User Index  [SM] Bandar Harris, DO              Scribe Attestation:   Scribe #1: I performed the above scribed service and the documentation accurately describes the services I performed. I attest to the accuracy of the note.    Physician Attestation for Scribe: I, Dr Harris, reviewed documentation as scribed in my presence, which is both accurate and complete.  Diagnostic Impression:    1. Acute pharyngitis, unspecified etiology    2. Tonsillitis         ED Disposition Condition    Discharge Stable          ED Prescriptions     Medication Sig Dispense Start Date End Date Auth. Provider    amoxicillin-clavulanate 875-125mg (AUGMENTIN) 875-125 mg per tablet Take 1 tablet by mouth 2  (two) times daily. for 10 days 20 tablet 12/23/2021 1/2/2022 Norman Aldana MD    methylPREDNISolone (MEDROL DOSEPACK) 4 mg tablet Please take this as it is prescribed and written with current instructions 1 each 12/23/2021 1/13/2022 Norman Aldana MD    diphenhydrAMINE-aluminum-magnesium hydroxide-simethicone-LIDOcaine HCl 2% Swish and spit 15 mLs every 4 (four) hours as needed (sore throat and difficulty swallowing). 300 mL 12/23/2021 12/26/2021 Norman Aldana MD        Follow-up Information     Follow up With Specialties Details Why Contact Info    Your primary care doctor  Schedule an appointment as soon as possible for a visit       [facility] - Emergency Dept Emergency Medicine  If symptoms worsen or difficulty breathing [address]  [facility] [id number]  [phone]           Bandar Harris,   12/23/21 2155

## 2021-12-24 NOTE — HPI
32 y/o F presents to the ED as a transfer from [facility] for tonsillitis with concern for impending airway. Patient is sitting up in bed comfortably. States she initially started having a sore throat on Tuesday and progressively got worse. Endorses difficulty and pain swallowing, voice change, chills, and swollen lymph nodes. Denies any fevers or cough. CT obtained at OSH consistent with tonsillitis. Took one pill of azithromycin this morning, IV abx and steroids at OSH.

## 2021-12-24 NOTE — ED TRIAGE NOTES
Karlee Bennett, a 31 y.o. female presents to the ED w/ complaint of sore throat x3 days. Pt has swollen tonsils with white exudate. Pt reports voice change. Pt was given steroid, 2 L of LR, and clindamycin at [facility]. Pt states some relief after steroid but pain has returned.     Triage note:  Chief Complaint   Patient presents with    Sore Throat     Pt c.o sore throat on 3 days ago worse last night and this AM.  Pt has large tonsils with both tonsils with white coating.  Pt is drooling in traige due to pain when swallowing.  AAO  x 3 nadn skin w.d      Review of patient's allergies indicates:   Allergen Reactions    Neosporin [benzalkonium chloride] Dermatitis    Tetracyclines      Past Medical History:   Diagnosis Date    Sickle cell trait

## 2021-12-24 NOTE — DISCHARGE INSTRUCTIONS
Diagnosis:   1. Acute pharyngitis, unspecified etiology    2. Tonsillitis        Tests you had showed:   Labs Reviewed   COMPREHENSIVE METABOLIC PANEL - Abnormal; Notable for the following components:       Result Value    Total Protein 8.5 (*)     Total Bilirubin 1.2 (*)     ALT 9 (*)     All other components within normal limits   CBC W/ AUTO DIFFERENTIAL - Abnormal; Notable for the following components:    WBC 19.53 (*)     Immature Granulocytes 0.6 (*)     Gran # (ANC) 18.2 (*)     Immature Grans (Abs) 0.12 (*)     Lymph # 0.5 (*)     Gran % 93.0 (*)     Lymph % 2.6 (*)     Mono % 3.6 (*)     All other components within normal limits   GROUP A STREP, MOLECULAR   CULTURE, RESPIRATORY  - THROAT   CULTURE, GONOCOCCUS   HETEROPHILE AB SCREEN   LACTIC ACID, PLASMA   CBC W/ AUTO DIFFERENTIAL   SARS-COV-2 RDRP GENE   POCT URINE PREGNANCY      CT Soft Tissue Neck With Contrast   Final Result      Bilateral tonsillitis.  Developing tiny left tonsillar abscesses.  Associated bilateral reactive neck adenopathy.      No epiglottitis.         Electronically signed by: Sherice Mayorga   Date:    12/23/2021   Time:    18:56          Treatments you received were:   Medications   dexamethasone injection 4 mg (has no administration in time range)   dexamethasone injection 8 mg (8 mg Intravenous Given 12/23/21 1645)   cefTRIAXone (ROCEPHIN) 1 g/50 mL D5W IVPB (0 g Intravenous Stopped 12/23/21 1734)   lactated ringers bolus 1,000 mL (0 mLs Intravenous Stopped 12/23/21 1945)   iohexoL (OMNIPAQUE 350) injection 100 mL (100 mLs Intravenous Given 12/23/21 1806)   lactated ringers bolus 1,000 mL (0 mLs Intravenous Stopped 12/23/21 2020)   clindamycin in D5W 600 mg/50 mL IVPB 600 mg (0 mg Intravenous Stopped 12/23/21 1916)       Home Care Instructions:  - Medications: Continue taking your home medications as prescribed    Follow-Up Plan:  - Follow-up with: Primary care doctor within 2-3  days  - Additional testing and/or evaluation will  be directed by your primary doctor    Return to the Emergency Department for symptoms including but not limited to: worsening symptoms, severe back pain, shortness of breath or chest pain, difficulty with secretions, vomiting with inability to hold down fluids, blood in vomit or poop, fevers greater than 100.4°F, passing out/fainting/unconsciousness, or other concerning symptoms.     No future appointments.

## 2021-12-24 NOTE — ASSESSMENT & PLAN NOTE
30 y/o F with tonsillitis.     - no acute ENT intervention  - recommend course of Augmentin with medrol dosepak  - please call with questions or concerns

## 2021-12-24 NOTE — ED NOTES
Pt transfer from [facility] for ENT consult of tonsillitis, given steroids and antibiotics at outside facility. Pt complaining of difficulty swallowing

## 2021-12-24 NOTE — ED PROVIDER NOTES
Encounter date: 8:54 PM 12/23/2021    Source of History   Patient and EMS    Chief Complaint   Pt presents with:   Sore Throat (Pt c.o sore throat on 3 days ago worse last night and this AM.  Pt has large tonsils with both tonsils with white coating.  Pt is drooling in traige due to pain when swallowing.  AAO  x 3 nadn skin w.d )      History Of Present Illness   Karlee Bennett is a 31 y.o. female with no significant past medical history who presents to the ED as a transfer from outside [facility] facility.  Patient has had sore throat for 2-3 days.  She notes severe discomfort in her throat.  She states she has not been able to eat.  She states that she feels dehydrated.  She has also noted a sore throat.  She denied rhinorrhea cough and trouble breathing.  She presents [facility] facility where she was given 2 L of fluids, clindamycin and Rocephin as well as 8 mg of Decadron.  Her CT showed bilateral tonsillitis and concern for developing tiny left tonsillar abscess.  She was sent to [facility] in route there were no difficulties.  No interventions performed in route.  On arrival she notes sore throat.  She is able to swallow her secretions but states that is painful.  She denies any fever chest pain or difficulty breathing at this time.      This is the extent to the patients complaints today here in the emergency department.    Review Of Systems   As per HPI and below:  Positive for:  Sore throat  Constitutional: No fever.   HEENT: + voice change.   Eyes:  No visual disturbances. No eye pain.   Respiratory:  No shortness of breath. No wheezing. No cough.   Cardio:  No chest pain. No leg swelling. No palpitations.   GI:  No abdominal pain. No nausea or vomiting. No diarrhea.   :  No blood in urine. No difficulty urinating.   MSK:  No back pain. + neck pain.   Skin: No rash.   Neurologic: No headache. No dizziness.       Review of patient's allergies indicates:   Allergen Reactions    Neosporin  [benzalkonium chloride] Dermatitis    Tetracyclines        Current Facility-Administered Medications on File Prior to Encounter   Medication Dose Route Frequency Provider Last Rate Last Admin    copper  mm  380 mm Intrauterine  Michelle Alicia MD   380 mm at 03/26/19 1033     Current Outpatient Medications on File Prior to Encounter   Medication Sig Dispense Refill    dapsone (ACZONE) 7.5 % GlwP       lamoTRIgine (LAMICTAL) 200 MG tablet TAKE 1 TABLET BY MOUTH DAILY AND 1/2 TABLET AT BEDTIME      LORYNA, 28, 3-0.02 mg per tablet Take 1 tablet by mouth once daily. 90 tablet 3    QUEtiapine (SEROQUEL) 300 MG Tab Take 300 mg by mouth once daily.      spironolactone (ALDACTONE) 100 MG tablet          Past History   As per HPI and below:  Past Medical History:   Diagnosis Date    Sickle cell trait      Past Surgical History:   Procedure Laterality Date    WISDOM TOOTH EXTRACTION  2011       Social History     Socioeconomic History    Marital status: Single   Tobacco Use    Smoking status: Never Smoker    Smokeless tobacco: Never Used   Substance and Sexual Activity    Alcohol use: Yes    Drug use: No    Sexual activity: Yes     Partners: Male       Family History   Problem Relation Age of Onset    Breast cancer Maternal Aunt 45    Colon cancer Maternal Grandfather 55    Breast cancer Maternal Aunt 45       Physical Exam     Vitals:    12/23/21 1902 12/23/21 1944 12/23/21 2014 12/23/21 2028   BP: (!) 144/89 130/83 130/89 130/81   BP Location:    Right arm   Patient Position:    Lying   Pulse: 110 109 102 104   Resp:   18 18   Temp:   100.3 °F (37.9 °C) 99.2 °F (37.3 °C)   TempSrc:   Axillary Oral   SpO2: 99% 98% 99% 97%   Weight:   59 kg (130 lb)    Height:         Physical Exam:   Nursing note and vitals reviewed.  Appearance:  Well-appearing, nontoxic adult female in no acute respiratory distress.  Making purposeful movements.  Speaking in full sentences.  Alterations in phonation  noted.  Skin: No rashes seen.  Good turgor.  No abrasions.  No ecchymoses.  Eyes: No conjunctival injection. EOMI, PERRL.  Head: NC/AT  ENT:  Bilateral exudates noted on examination of oropharynx.  She is noted to have a 4 finger trismus.  No tenderness to palpation of neck.  Uvula midline.  Chest: CTAB. No increased work of breathing, bilateral chest rise.  No stridor noted.  Cardiovascular: Regular rate and rhythm.  Normal equal bilateral radial pulses.  Abdomen: Soft.  Not distended.  Nontender.  No guarding.  No rebound. No Masses  Musculoskeletal: Good range of motion all joints.  No deformities.  Neck supple, full range of motion, no obvious deformity.  Neurologic: Moves all extremities.  Normal sensation.  No facial droop.  Normal speech.    Mental Status:  Alert and oriented x 3.  Appropriate, conversant.      Results and Medications    Procedures    Labs Reviewed   COMPREHENSIVE METABOLIC PANEL - Abnormal; Notable for the following components:       Result Value    Total Protein 8.5 (*)     Total Bilirubin 1.2 (*)     ALT 9 (*)     All other components within normal limits   CBC W/ AUTO DIFFERENTIAL - Abnormal; Notable for the following components:    WBC 19.53 (*)     Immature Granulocytes 0.6 (*)     Gran # (ANC) 18.2 (*)     Immature Grans (Abs) 0.12 (*)     Lymph # 0.5 (*)     Gran % 93.0 (*)     Lymph % 2.6 (*)     Mono % 3.6 (*)     All other components within normal limits   GROUP A STREP, MOLECULAR   CULTURE, RESPIRATORY  - THROAT   CULTURE, GONOCOCCUS   HETEROPHILE AB SCREEN   LACTIC ACID, PLASMA   CBC W/ AUTO DIFFERENTIAL   SARS-COV-2 RDRP GENE   POCT URINE PREGNANCY       Imaging Results          CT Soft Tissue Neck With Contrast (Final result)  Result time 12/23/21 18:56:26    Final result by Sherice Mayorga MD (12/23/21 18:56:26)                 Impression:      Bilateral tonsillitis.  Developing tiny left tonsillar abscesses.  Associated bilateral reactive neck adenopathy.    No  epiglottitis.      Electronically signed by: Sherice Mayorga  Date:    12/23/2021  Time:    18:56             Narrative:    EXAMINATION:  CT soft tissue neck with    CLINICAL HISTORY:  Epiglottitis or tonsillitis suspected;    TECHNIQUE:  1.25 mm and 2.5 mm axial images were obtained through the soft tissues of the neck.  Coronal and sagittal reformatted images were provided.  100 cc Omnipaque was injected.    COMPARISON:  None.    FINDINGS:  There is enlargement of bilateral palatine tonsils with striations.  The palatine tonsils meet in the midline and referred to as kissing tonsils.  There are interval development of small rounded tiny hypodense areas within the left enlarged palatine tonsil, which may be tiny developing abscesses.  There are enlarged bilateral neck lymph nodes.  The largest are seen in the anterior triangles bilaterally.  The largest is in the left anterior triangle measuring 17 x 18 mm.  There is infiltration the fat planes of the neck greater on the left.  There is no evidence of epiglottitis.  There is a 7 hypodensity in the left thyroid gland.                                    Medications   dexamethasone injection 8 mg (8 mg Intravenous Given 12/23/21 1645)   cefTRIAXone (ROCEPHIN) 1 g/50 mL D5W IVPB (0 g Intravenous Stopped 12/23/21 1734)   lactated ringers bolus 1,000 mL (0 mLs Intravenous Stopped 12/23/21 1945)   iohexoL (OMNIPAQUE 350) injection 100 mL (100 mLs Intravenous Given 12/23/21 1806)   lactated ringers bolus 1,000 mL (0 mLs Intravenous Stopped 12/23/21 2020)   clindamycin in D5W 600 mg/50 mL IVPB 600 mg (0 mg Intravenous Stopped 12/23/21 1916)   dexamethasone injection 4 mg (4 mg Intravenous Given 12/23/21 2055)   (Magic mouthwash) 1:1:1 Benadryl 12.5mg/5ml liq, aluminum & magnesium hydroxide-simehticone (Maalox), LIDOcaine viscous 2% (10 mLs Swish & Spit Given 12/23/21 2103)   acetaminophen tablet 1,000 mg (1,000 mg Oral Given 12/23/21 2102)       MDM, Impression and Plan    Previous Records:  I decided to obtain old medical records which showed:  Patient was seen at previous [facility] facility and CT was noted for bilateral tonsillitis with a small developing abscess on the left.    Initial Assessment:   Urgent evaluation of 31 y.o. female with no past medical history presents the ED from outside [facility] facility for evaluation for ENT for tonsillitis and developing left peritonsillar abscess.  On arrival to the ED she is noted to be afebrile hemodynamically stable and not in respiratory distress.  No stridor appreciated on respiratory exam.  Differential Diagnosis:   Tonsillitis  -peritonsillar abscess  -Naga's angina    Clinical Tests:   Lab Tests: Ordered and Reviewed  Radiological Study: Ordered and Reviewed  Medical Tests: Ordered and Reviewed    ED Management:    Prior to arrival patient received 2 L of fluids, clindamycin, Rocephin and 8 mg of Decadron.  COVID negative.  Throat culture and gonococcal culture pending.  She is noted to have a leukocytosis thought to be due to her infection.  Hemoglobin near baseline.  CMP grossly unrevealing.  No lactic acidosis.  Mono negative.  Urine pregnancy negative.  CT reviewed and shows bilateral tonsillitis with small developing abscess of the left.  She is in no acute respiratory distress.  She is tolerating secretions.  She was given 4 mg of IV Decadron on arrival.  I called discussed the case with ENT who agreed to see the patient.  On their exam they see no current indications for admission and no current impending airway obstruction.  She was given Magic mouthwash unable to swish and swallow and tolerate p.o. fluids and Tylenol.  She was discharged with a script for Augmentin, Medrol Dosepak and magic mouthwash.  She is instructed to follow-up with her PCP in the next 1-2 days.  Strict return precautions were discussed.  She voiced verbal understanding agreement the plan.  Her mother at bedside was also agreeable with the plan.   Patient deemed stable for discharge.  Please see ED course for discussion of labs.     I called and discussed the case with:  ENT         Final diagnoses:  [J02.9] Acute pharyngitis, unspecified etiology (Primary)  [J03.90] Tonsillitis          ED Disposition Condition    Discharge Stable        ED Prescriptions     Medication Sig Dispense Start Date End Date Auth. Provider    amoxicillin-clavulanate 875-125mg (AUGMENTIN) 875-125 mg per tablet Take 1 tablet by mouth 2 (two) times daily. for 10 days 20 tablet 12/23/2021 1/2/2022 Norman Aldana MD    methylPREDNISolone (MEDROL DOSEPACK) 4 mg tablet Please take this as it is prescribed and written with current instructions 1 each 12/23/2021 1/13/2022 Norman Aldana MD    diphenhydrAMINE-aluminum-magnesium hydroxide-simethicone-LIDOcaine HCl 2% Swish and spit 15 mLs every 4 (four) hours as needed (sore throat and difficulty swallowing). 300 mL 12/23/2021 12/26/2021 Norman Aldana MD        Follow-up Information     Follow up With Specialties Details Why Contact Info    Your primary care doctor  Schedule an appointment as soon as possible for a visit       [facility] - Emergency Dept Emergency Medicine  If symptoms worsen or difficulty breathing [address] [id number] [phone]          ED Course as of 12/23/21 2107   Thu Dec 23, 2021   1659 Preg Test, Ur: Negative [SM]   1713 Monospot: Negative [SM]   1713 Group A Strep, Molecular: Negative [SM]   1725 SARS-CoV-2 RNA, Amplification, Qual: Negative [SM]   1737 eGFR if : >60 [SM]   1737 Sodium: 138 [SM]   1737 Lactate, Nathanael: 1.1 [SM]   1808 WBC(!): 19.53 [SM]   1808 Hemoglobin: 13.2 [SM]   1830 Updated the patient on the results.  Pending results of CT scan.  Called the transfer center to discuss with ENT.  Currently no respiratory distress but still with muffled voice in the patient states that she feels that her throat is getting tighter. [SM]   1851 I spoke with   Naun with ENT at [facility].  After discussion patient to be transferred Emergency Department to emergency department for observation. [SM]   1937 CT Soft Tissue Neck With Contrast  CT scan shows bilateral tonsillitis with concern of developing small abscesses on the left tonsil. [SM]      ED Course User Index  [SM] Bandar Harris DO           Attending Attestation:   Physician Attestation Statement for Resident:  As the supervising MD   Physician Attestation Statement: I have personally seen and examined this patient.   I agree with the above history. -:   As the supervising MD I agree with the above PE.    As the supervising MD I agree with the above treatment, course, plan, and disposition.   -:   31-year-old female transferred from outside hospital for tonsillitis.  Airway is patent.  Her voice is muffled but she has no increased work of breathing or stridor.  She received antibiotics and steroids at outside hospital.  Here she is tolerating p.o..  She was seen by ENT, who recommended discharge home with ongoing antibiotics.  Patient was updated regarding findings and plan.  Discharged home in stable condition.  Return precautions discussed at bedside.  I have reviewed and agree with the residents interpretation of the following: lab data and CT scans.  I have reviewed the following: records from a referring facility.                    Norman Aldana MD   Emergency Resident      Norman Aldana MD  Resident  12/23/21 2104       Nya Ramirez MD  12/23/21 [id number]

## 2021-12-24 NOTE — SUBJECTIVE & OBJECTIVE
Medications:  Continuous Infusions:  Scheduled Meds:   (Magic mouthwash) 1:1:1 Benadryl 12.5mg/5ml liq, aluminum & magnesium hydroxide-simehticone (Maalox), LIDOcaine viscous 2%  10 mL Swish & Spit ED 1 Time    copper intrauterine device  380 mm Intrauterine     dexamethasone  4 mg Intravenous ED 1 Time     PRN Meds:     Current Facility-Administered Medications on File Prior to Encounter   Medication    copper  mm     Current Outpatient Medications on File Prior to Encounter   Medication Sig    dapsone (ACZONE) 7.5 % GlwP     lamoTRIgine (LAMICTAL) 200 MG tablet TAKE 1 TABLET BY MOUTH DAILY AND 1/2 TABLET AT BEDTIME    LORYNA, 28, 3-0.02 mg per tablet Take 1 tablet by mouth once daily.    QUEtiapine (SEROQUEL) 300 MG Tab Take 300 mg by mouth once daily.    spironolactone (ALDACTONE) 100 MG tablet        Review of patient's allergies indicates:   Allergen Reactions    Neosporin [benzalkonium chloride] Dermatitis    Tetracyclines        Past Medical History:   Diagnosis Date    Sickle cell trait      Past Surgical History:   Procedure Laterality Date    WISDOM TOOTH EXTRACTION  2011     Family History     Problem Relation (Age of Onset)    Breast cancer Maternal Aunt (45), Maternal Aunt (45)    Colon cancer Maternal Grandfather (55)        Tobacco Use    Smoking status: Never Smoker    Smokeless tobacco: Never Used   Substance and Sexual Activity    Alcohol use: Yes    Drug use: No    Sexual activity: Yes     Partners: Male     Review of Systems   Constitutional: Positive for chills. Negative for fatigue.   HENT: Positive for sore throat, trouble swallowing and voice change.    Respiratory: Negative for shortness of breath.    All other systems reviewed and are negative.    Objective:     Vital Signs (Most Recent):  Temp: 99.2 °F (37.3 °C) (12/23/21 2028)  Pulse: 104 (12/23/21 2028)  Resp: 18 (12/23/21 2028)  BP: 130/81 (12/23/21 2028)  SpO2: 97 % (12/23/21 2028) Vital Signs (24h  Range):  Temp:  [99.2 °F (37.3 °C)-101.1 °F (38.4 °C)] 99.2 °F (37.3 °C)  Pulse:  [102-116] 104  Resp:  [18] 18  SpO2:  [96 %-100 %] 97 %  BP: (128-158)/(81-99) 130/81     Weight: 59 kg (130 lb)  Body mass index is 22.31 kg/m².    Date 12/23/21 0700 - 12/24/21 0659   Shift 5392-6111 7539-5062 7358-1785 24 Hour Total   INTAKE   IV Piggyback  1055.3  1055.3   Shift Total(mL/kg)  1055.3(17.9)  1055.3(17.9)   OUTPUT   Shift Total(mL/kg)       Weight (kg)  59 59 59       Physical Exam  Constitutional: Non-toxic appearing. Muffled voice. NAD.  Eyes: EOMI Bilaterally  Head/Face: Normocephalic.  Negative paranasal sinus pressure/tenderness.  Salivary glands WNL.  House Brackmann I Bilaterally.  Right Ear:  Auricle WNL.  Left Ear:  Auricle WNL.  Nose:  External nasal skin without masses/lesions.  Oral Cavity: Gingiva/lips WNL.  FOM Soft. Oral Tongue mobile. Hard Palate WNL.   Oropharynx: Bilateral 4+ tonsils with exudative film. Posterior oropharynx WNL.  Soft palate without masses. Midline uvula.   Neck/Lymphatic: Shotty lymphadenopathy.  No thyromegaly.    Neuro/Psychiatric: AOx3.  Normal mood and affect.   Respiratory: Normal respiratory effort, no stridor/stertor, no retractions noted.      Significant Labs:  BMP:   Recent Labs   Lab 12/23/21  1647      CL 98   CO2 24   BUN 7   CREATININE 0.9   CALCIUM 10.3     CBC:   Recent Labs   Lab 12/23/21  1647   WBC 19.53*   RBC 4.48   HGB 13.2   HCT 37.1      MCV 83   MCH 29.5   MCHC 35.6       Significant Diagnostics:  CT: I have reviewed all pertinent results/findings within the past 24 hours and my personal findings are:  tonsillitis with reactive lymphadenopathy

## 2021-12-24 NOTE — CONSULTS
[facility] - Emergency Dept  Otorhinolaryngology-Head & Neck Surgery  Consult Note    Patient Name: Karlee Bennett  MRN: [id number]  Code Status: No Order  Admission Date: 12/23/2021  Hospital Length of Stay: 0 days  Attending Physician: Nya Ramirez MD  Primary Care Provider: Primary Doctor No    Patient information was obtained from patient.     Inpatient consult to ENT  Consult performed by: Colin Callaway MD  Consult ordered by: Norman Aldana MD        Subjective:     Chief Complaint/Reason for Admission: sore throat    History of Present Illness: 30 y/o F presents to the ED as a transfer from [facility] for tonsillitis. Patient is sitting up in bed comfortably. States she initially started having a sore throat on Tuesday and progressively got worse. Endorses difficulty and pain swallowing, voice change, chills, and swollen lymph nodes. Denies any fevers or cough. CT obtained at OSH consistent with tonsillitis. Took one pill of azithromycin this morning, IV abx and steroids at OSH.              Medications:  Continuous Infusions:  Scheduled Meds:   (Magic mouthwash) 1:1:1 Benadryl 12.5mg/5ml liq, aluminum & magnesium hydroxide-simehticone (Maalox), LIDOcaine viscous 2%  10 mL Swish & Spit ED 1 Time    copper intrauterine device  380 mm Intrauterine     dexamethasone  4 mg Intravenous ED 1 Time     PRN Meds:     Current Facility-Administered Medications on File Prior to Encounter   Medication    copper  mm     Current Outpatient Medications on File Prior to Encounter   Medication Sig    dapsone (ACZONE) 7.5 % GlwP     lamoTRIgine (LAMICTAL) 200 MG tablet TAKE 1 TABLET BY MOUTH DAILY AND 1/2 TABLET AT BEDTIME    LORYNA, 28, 3-0.02 mg per tablet Take 1 tablet by mouth once daily.    QUEtiapine (SEROQUEL) 300 MG Tab Take 300 mg by mouth once daily.    spironolactone (ALDACTONE) 100 MG tablet        Review of patient's allergies indicates:   Allergen Reactions    Neosporin [benzalkonium  chloride] Dermatitis    Tetracyclines        Past Medical History:   Diagnosis Date    Sickle cell trait      Past Surgical History:   Procedure Laterality Date    WISDOM TOOTH EXTRACTION  2011     Family History     Problem Relation (Age of Onset)    Breast cancer Maternal Aunt (45), Maternal Aunt (45)    Colon cancer Maternal Grandfather (55)        Tobacco Use    Smoking status: Never Smoker    Smokeless tobacco: Never Used   Substance and Sexual Activity    Alcohol use: Yes    Drug use: No    Sexual activity: Yes     Partners: Male     Review of Systems   Constitutional: Positive for chills. Negative for fatigue.   HENT: Positive for sore throat, trouble swallowing and voice change.    Respiratory: Negative for shortness of breath.    All other systems reviewed and are negative.    Objective:     Vital Signs (Most Recent):  Temp: 99.2 °F (37.3 °C) (12/23/21 2028)  Pulse: 104 (12/23/21 2028)  Resp: 18 (12/23/21 2028)  BP: 130/81 (12/23/21 2028)  SpO2: 97 % (12/23/21 2028) Vital Signs (24h Range):  Temp:  [99.2 °F (37.3 °C)-101.1 °F (38.4 °C)] 99.2 °F (37.3 °C)  Pulse:  [102-116] 104  Resp:  [18] 18  SpO2:  [96 %-100 %] 97 %  BP: (128-158)/(81-99) 130/81     Weight: 59 kg (130 lb)  Body mass index is 22.31 kg/m².    Date 12/23/21 0700 - 12/24/21 0659   Shift 3147-1975 6856-9914 3531-4520 24 Hour Total   INTAKE   IV Piggyback  1055.3  1055.3   Shift Total(mL/kg)  1055.3(17.9)  1055.3(17.9)   OUTPUT   Shift Total(mL/kg)       Weight (kg)  59 59 59       Physical Exam  Constitutional: Non-toxic appearing. Muffled voice. NAD.  Eyes: EOMI Bilaterally  Head/Face: Normocephalic.  Negative paranasal sinus pressure/tenderness.  Salivary glands WNL.  House Brackmann I Bilaterally.  Right Ear:  Auricle WNL.  Left Ear:  Auricle WNL.  Nose:  External nasal skin without masses/lesions.  Oral Cavity: Gingiva/lips WNL.  FOM Soft. Oral Tongue mobile. Hard Palate WNL.   Oropharynx: Bilateral 4+ tonsils with exudative film.  Posterior oropharynx WNL.  Soft palate without masses. Midline uvula.   Neck/Lymphatic: Shotty lymphadenopathy.  No thyromegaly.    Neuro/Psychiatric: AOx3.  Normal mood and affect.   Respiratory: Normal respiratory effort, no stridor/stertor, no retractions noted.      Significant Labs:  BMP:   Recent Labs   Lab 12/23/21  1647      CL 98   CO2 24   BUN 7   CREATININE 0.9   CALCIUM 10.3     CBC:   Recent Labs   Lab 12/23/21  1647   WBC 19.53*   RBC 4.48   HGB 13.2   HCT 37.1      MCV 83   MCH 29.5   MCHC 35.6       Significant Diagnostics:  CT: I have reviewed all pertinent results/findings within the past 24 hours and my personal findings are:  tonsillitis with reactive lymphadenopathy    Assessment/Plan:     Tonsillitis  30 y/o F with tonsillitis.    - no acute ENT intervention  - recommend course of Augmentin and medrol dosepak  - please call with questions or concerns      VTE Risk Mitigation (From admission, onward)    None          Thank you for your consult. I will sign off. Please contact us if you have any additional questions.    Colin Callaway MD  Otorhinolaryngology-Head & Neck Surgery  [facility] Emergency Dept

## 2021-12-24 NOTE — ED NOTES
Patient care assumed. Patient placed on cardiac monitor, bp cuff, and continuous pulse ox cycling Q30 min. Call light within reach. Family at bedside.

## 2021-12-27 LAB
BACTERIA GENITAL AEROBE CULT: NORMAL
BACTERIA THROAT CULT: NORMAL

## 2022-01-14 ENCOUNTER — OFFICE VISIT (OUTPATIENT)
Dept: INTERNAL MEDICINE | Facility: CLINIC | Age: 32
End: 2022-01-14
Payer: COMMERCIAL

## 2022-01-14 ENCOUNTER — LAB VISIT (OUTPATIENT)
Dept: LAB | Facility: HOSPITAL | Age: 32
End: 2022-01-14
Attending: STUDENT IN AN ORGANIZED HEALTH CARE EDUCATION/TRAINING PROGRAM
Payer: COMMERCIAL

## 2022-01-14 VITALS
DIASTOLIC BLOOD PRESSURE: 76 MMHG | WEIGHT: 135.06 LBS | OXYGEN SATURATION: 98 % | HEIGHT: 64 IN | HEART RATE: 76 BPM | SYSTOLIC BLOOD PRESSURE: 112 MMHG | BODY MASS INDEX: 23.06 KG/M2

## 2022-01-14 DIAGNOSIS — D57.3 SICKLE CELL TRAIT: ICD-10-CM

## 2022-01-14 DIAGNOSIS — J03.90 TONSILLITIS: ICD-10-CM

## 2022-01-14 DIAGNOSIS — L70.0 ACNE VULGARIS: ICD-10-CM

## 2022-01-14 DIAGNOSIS — Z76.89 ENCOUNTER TO ESTABLISH CARE WITH NEW DOCTOR: Primary | ICD-10-CM

## 2022-01-14 DIAGNOSIS — Z76.89 ENCOUNTER TO ESTABLISH CARE WITH NEW DOCTOR: ICD-10-CM

## 2022-01-14 LAB
CHOLEST SERPL-MCNC: 228 MG/DL (ref 120–199)
CHOLEST/HDLC SERPL: 3 {RATIO} (ref 2–5)
HDLC SERPL-MCNC: 76 MG/DL (ref 40–75)
HDLC SERPL: 33.3 % (ref 20–50)
LDLC SERPL CALC-MCNC: 130.4 MG/DL (ref 63–159)
NONHDLC SERPL-MCNC: 152 MG/DL
TRIGL SERPL-MCNC: 108 MG/DL (ref 30–150)

## 2022-01-14 PROCEDURE — 80061 LIPID PANEL: CPT | Performed by: STUDENT IN AN ORGANIZED HEALTH CARE EDUCATION/TRAINING PROGRAM

## 2022-01-14 PROCEDURE — 86803 HEPATITIS C AB TEST: CPT | Performed by: STUDENT IN AN ORGANIZED HEALTH CARE EDUCATION/TRAINING PROGRAM

## 2022-01-14 PROCEDURE — 99999 PR PBB SHADOW E&M-EST. PATIENT-LVL III: ICD-10-PCS | Mod: PBBFAC,,, | Performed by: STUDENT IN AN ORGANIZED HEALTH CARE EDUCATION/TRAINING PROGRAM

## 2022-01-14 PROCEDURE — 99204 PR OFFICE/OUTPT VISIT, NEW, LEVL IV, 45-59 MIN: ICD-10-PCS | Mod: S$GLB,,, | Performed by: STUDENT IN AN ORGANIZED HEALTH CARE EDUCATION/TRAINING PROGRAM

## 2022-01-14 PROCEDURE — 87389 HIV-1 AG W/HIV-1&-2 AB AG IA: CPT | Performed by: STUDENT IN AN ORGANIZED HEALTH CARE EDUCATION/TRAINING PROGRAM

## 2022-01-14 PROCEDURE — 36415 COLL VENOUS BLD VENIPUNCTURE: CPT | Performed by: STUDENT IN AN ORGANIZED HEALTH CARE EDUCATION/TRAINING PROGRAM

## 2022-01-14 PROCEDURE — 99204 OFFICE O/P NEW MOD 45 MIN: CPT | Mod: S$GLB,,, | Performed by: STUDENT IN AN ORGANIZED HEALTH CARE EDUCATION/TRAINING PROGRAM

## 2022-01-14 PROCEDURE — 99999 PR PBB SHADOW E&M-EST. PATIENT-LVL III: CPT | Mod: PBBFAC,,, | Performed by: STUDENT IN AN ORGANIZED HEALTH CARE EDUCATION/TRAINING PROGRAM

## 2022-01-14 RX ORDER — METHYLPREDNISOLONE 4 MG/1
TABLET ORAL
Qty: 21 EACH | Refills: 0 | Status: SHIPPED | OUTPATIENT
Start: 2022-01-14 | End: 2022-02-04

## 2022-01-14 NOTE — PROGRESS NOTES
INTERNAL MEDICINE INITIAL VISIT NOTE      CHIEF COMPLAINT     No chief complaint on file.      ASSESSMENT/PLAN     Karlee Bennett is a 31 y.o. female with sickle cell trait here to establish care.     1. Encounter to establish care with new doctor  - Lipid Panel; Future  - HIV 1/2 Ag/Ab (4th Gen); Future  - Hepatitis C antibody; Future    2. Tonsillitis  Still with significant swelling and discomfort to tonsils >2 weeks since infection. No further fever or exudate. Will give additional medrol dose pack and consider ENT referral if continuing swelling and pain.   - methylPREDNISolone (MEDROL DOSEPACK) 4 mg tablet; use as directed  Dispense: 21 each; Refill: 0    3. Acne vulgaris  Managed by derm. On topical retinoid and dapsone. Considering restarting spironolactone.     4. Sickle cell trait  Asymptomatic, carrier. Discussed testing if she was to consider pregnancy in the near future.       HM reviewed and discussed in detail with the patient.     RTC in 1 yr, sooner if needed and depending on labs.    HPI     Karlee Bennett is a 31 y.o. female who presents with sickle cell trait, here today to establish care.      Karlee was seen in the ER at [facility] for tonsillitis on 12/23. Was treated with PO abx and steroids after getting evaluated by ENT. Still having a sore/hoarse throat.     Diet: no food restrictions, eat fairly healthy. Minimally goes out to eat. Mediterranean-based diet.   Exercise: not yet   Menses: very light, regular (had some interruptions due to stress from covid). Currently not on birth control - hated copper IUD and loryna.     Acne - was recently seen by dermatologist. Managed with spironolactone in the past, using retin-A and dapsone. Worse when on birth control (specifically IUD)        Past Medical History:  Past Medical History:   Diagnosis Date    Sickle cell trait        Past Surgical History:  Past Surgical History:   Procedure Laterality Date    WISDOM TOOTH EXTRACTION  2011        Home Medications:  Prior to Admission medications    Medication Sig Start Date End Date Taking? Authorizing Provider   dapsone (ACZONE) 7.5 % GlwP  2/1/21   Historical Provider   lamoTRIgine (LAMICTAL) 200 MG tablet TAKE 1 TABLET BY MOUTH DAILY AND 1/2 TABLET AT BEDTIME 12/31/20   Historical Provider   LORYNA, 28, 3-0.02 mg per tablet Take 1 tablet by mouth once daily. 2/2/21   Shahla Hernandez NP   methylPREDNISolone (MEDROL DOSEPACK) 4 mg tablet Please take this as it is prescribed and written with current instructions 12/23/21 1/13/22  Norman Aldana MD   QUEtiapine (SEROQUEL) 300 MG Tab Take 300 mg by mouth once daily. 1/10/21   Historical Provider   spironolactone (ALDACTONE) 100 MG tablet  3/18/19   Historical Provider       Allergies:  Review of patient's allergies indicates:   Allergen Reactions    Neosporin [benzalkonium chloride] Dermatitis    Tetracyclines        Family History:  Family History   Problem Relation Age of Onset    Breast cancer Maternal Aunt 45    Colon cancer Maternal Grandfather 55    Breast cancer Maternal Aunt 45       Social History:  Social History     Tobacco Use    Smoking status: Never Smoker    Smokeless tobacco: Never Used   Substance Use Topics    Alcohol use: Yes    Drug use: No       Review of Systems:  Review of Systems   Constitutional: Negative for fever and unexpected weight change.   HENT: Positive for sore throat, trouble swallowing and voice change. Negative for sinus pressure.    Eyes: Negative for visual disturbance.   Respiratory: Negative for cough and shortness of breath.    Cardiovascular: Negative for chest pain and palpitations.   Gastrointestinal: Negative for constipation, diarrhea, nausea and vomiting.   Endocrine: Negative for polyuria.   Genitourinary: Negative for dysuria and urgency.   Musculoskeletal: Negative for arthralgias and myalgias.   Skin: Negative for rash.   Allergic/Immunologic: Negative for environmental allergies.  "  Neurological: Negative for dizziness, light-headedness and headaches.   Hematological: Does not bruise/bleed easily.   Psychiatric/Behavioral: Negative for agitation and decreased concentration. The patient is not nervous/anxious.        Health Maintenance:   Immunizations:   Influenza - due   TDap - due    Cancer Screening:  Up to date  Seeing gyn soon      PHYSICAL EXAM     /76   Pulse 76   Ht 5' 4" (1.626 m)   Wt 61.3 kg (135 lb 0.5 oz)   SpO2 98%   BMI 23.18 kg/m²     GEN - A+OX4, NAD very healthy and well appearing young woman   HEENT - PERRL, EOMI, OP with enlarged tonsils, no exudate  Neck - No thyromegaly or thyroid masses felt.  No cervical lymphadenopathy appreciated.  CV - RRR, no m/r/g   Chest - CTAB, no wheezing, crackles, or rhonchi   Abd - S/NT/ND/+BS.   Ext - 2+BDP. No C/C/E.  LN - No LAD appreciated.  Skin - Normal color and texture, no rash, no skin lesions.      LABS     Lab Results   Component Value Date    WBC 19.53 (H) 12/23/2021    HGB 13.2 12/23/2021    HCT 37.1 12/23/2021    MCV 83 12/23/2021     12/23/2021     Sodium   Date Value Ref Range Status   12/23/2021 138 136 - 145 mmol/L Final     Potassium   Date Value Ref Range Status   12/23/2021 3.9 3.5 - 5.1 mmol/L Final     Chloride   Date Value Ref Range Status   12/23/2021 98 95 - 110 mmol/L Final     CO2   Date Value Ref Range Status   12/23/2021 24 23 - 29 mmol/L Final     Glucose   Date Value Ref Range Status   12/23/2021 101 70 - 110 mg/dL Final     BUN   Date Value Ref Range Status   12/23/2021 7 6 - 20 mg/dL Final     Creatinine   Date Value Ref Range Status   12/23/2021 0.9 0.5 - 1.4 mg/dL Final     Calcium   Date Value Ref Range Status   12/23/2021 10.3 8.7 - 10.5 mg/dL Final     Total Protein   Date Value Ref Range Status   12/23/2021 8.5 (H) 6.0 - 8.4 g/dL Final     Albumin   Date Value Ref Range Status   12/23/2021 4.8 3.5 - 5.2 g/dL Final     Total Bilirubin   Date Value Ref Range Status   12/23/2021 1.2 (H) " 0.1 - 1.0 mg/dL Final     Comment:     For infants and newborns, interpretation of results should be based  on gestational age, weight and in agreement with clinical  observations.    Premature Infant recommended reference ranges:  Up to 24 hours.............<8.0 mg/dL  Up to 48 hours............<12.0 mg/dL  3-5 days..................<15.0 mg/dL  6-29 days.................<15.0 mg/dL       Alkaline Phosphatase   Date Value Ref Range Status   12/23/2021 72 55 - 135 U/L Final     AST   Date Value Ref Range Status   12/23/2021 15 10 - 40 U/L Final     ALT   Date Value Ref Range Status   12/23/2021 9 (L) 10 - 44 U/L Final     Anion Gap   Date Value Ref Range Status   12/23/2021 16 8 - 16 mmol/L Final     eGFR if    Date Value Ref Range Status   12/23/2021 >60 >60 mL/min/1.73 m^2 Final     eGFR if non    Date Value Ref Range Status   12/23/2021 >60 >60 mL/min/1.73 m^2 Final     Comment:     Calculation used to obtain the estimated glomerular filtration  rate (eGFR) is the CKD-EPI equation.        No results found for: LABA1C, HGBA1C  No results found for: LDLCALC  No results found for: TSH        Barbara Vazquez MD   [facility]   Department of Internal Medicine   01/14/2022

## 2022-01-14 NOTE — PATIENT INSTRUCTIONS
Ways to Lower Your Cholesterol:  Eating a diet low in saturated fats, increasing cardiovascular activity to 3-4 times weekly, avoiding tobacco use and controlling glycemic levels. The Mediterranean diet and DASH diet are two that I highly recommend. AHA (american heart association) recommends 150 minutes of moderate cardiovascular activity weekly.     Vitamin C, D, multivitamin, B complex

## 2022-01-17 LAB
HCV AB SERPL QL IA: NEGATIVE
HIV 1+2 AB+HIV1 P24 AG SERPL QL IA: NEGATIVE

## 2022-11-01 NOTE — PROCEDURES
Procedures   DATE: 12/21/2018    TIME: 1400    PROCEDURE:  Kyleena insertion    INDICATION: Contraception    PATIENT CONSENT: She was counseled on the risks, benefits, indications, and alternatives to IUD use.  She understands that with insertion there is a risk of bleeding, infection, and uterine perforation.  All questions are answered.  Consents signed.     LABS: UPT is negative.     Cervical cultures were not performed.    ANESTHESIA: NONE    PROCEDURE NOTE:    Time out performed.  The cervix was visualized with a speculum.  A single tooth tenaculum was placed on the anterior lip of the cervix.  The uterus sounds to 7.5cm using sterile technique.  A Kyleena was loaded and placed high in the uterine fundus without difficulty using sterile technique.  The string was was then cut.  The tenaculum and speculum were removed.    COMPLICATIONS: None    PATIENT DISPOSITION: The patient tolerated the procedure well.    Assessment:  1.  Contraceptive management/IUD insertion    Post IUD placement counseling:  Manage post IUD placement pain with NSAIDS, Tylenol or Rx per Med[facility].  IUD danger signs and how to check for strings were discussed.  The IUD needs to be removed in 5 years for Kyleena.  Follow up:  4 weeks for string check      Michelle Alicia MD 12/21/2018 11:18 AM         97

## 2023-02-09 ENCOUNTER — OFFICE VISIT (OUTPATIENT)
Dept: FAMILY MEDICINE | Facility: CLINIC | Age: 33
End: 2023-02-09
Payer: COMMERCIAL

## 2023-02-09 VITALS
BODY MASS INDEX: 24.28 KG/M2 | OXYGEN SATURATION: 96 % | RESPIRATION RATE: 18 BRPM | SYSTOLIC BLOOD PRESSURE: 110 MMHG | WEIGHT: 142.19 LBS | HEIGHT: 64 IN | DIASTOLIC BLOOD PRESSURE: 70 MMHG | TEMPERATURE: 98 F | HEART RATE: 83 BPM

## 2023-02-09 DIAGNOSIS — D57.3 SICKLE CELL TRAIT: ICD-10-CM

## 2023-02-09 DIAGNOSIS — Z00.00 WELL WOMAN EXAM WITHOUT GYNECOLOGICAL EXAM: Primary | ICD-10-CM

## 2023-02-09 DIAGNOSIS — Z12.39 BREAST CANCER SCREENING OTHER THAN MAMMOGRAM: ICD-10-CM

## 2023-02-09 PROCEDURE — 99999 PR PBB SHADOW E&M-EST. PATIENT-LVL III: ICD-10-PCS | Mod: PBBFAC,,, | Performed by: STUDENT IN AN ORGANIZED HEALTH CARE EDUCATION/TRAINING PROGRAM

## 2023-02-09 PROCEDURE — 3078F DIAST BP <80 MM HG: CPT | Mod: CPTII,S$GLB,, | Performed by: STUDENT IN AN ORGANIZED HEALTH CARE EDUCATION/TRAINING PROGRAM

## 2023-02-09 PROCEDURE — 99385 PR PREVENTIVE VISIT,NEW,18-39: ICD-10-PCS | Mod: S$GLB,,, | Performed by: STUDENT IN AN ORGANIZED HEALTH CARE EDUCATION/TRAINING PROGRAM

## 2023-02-09 PROCEDURE — 1159F MED LIST DOCD IN RCRD: CPT | Mod: CPTII,S$GLB,, | Performed by: STUDENT IN AN ORGANIZED HEALTH CARE EDUCATION/TRAINING PROGRAM

## 2023-02-09 PROCEDURE — 3008F BODY MASS INDEX DOCD: CPT | Mod: CPTII,S$GLB,, | Performed by: STUDENT IN AN ORGANIZED HEALTH CARE EDUCATION/TRAINING PROGRAM

## 2023-02-09 PROCEDURE — 3074F PR MOST RECENT SYSTOLIC BLOOD PRESSURE < 130 MM HG: ICD-10-PCS | Mod: CPTII,S$GLB,, | Performed by: STUDENT IN AN ORGANIZED HEALTH CARE EDUCATION/TRAINING PROGRAM

## 2023-02-09 PROCEDURE — 3078F PR MOST RECENT DIASTOLIC BLOOD PRESSURE < 80 MM HG: ICD-10-PCS | Mod: CPTII,S$GLB,, | Performed by: STUDENT IN AN ORGANIZED HEALTH CARE EDUCATION/TRAINING PROGRAM

## 2023-02-09 PROCEDURE — 99999 PR PBB SHADOW E&M-EST. PATIENT-LVL III: CPT | Mod: PBBFAC,,, | Performed by: STUDENT IN AN ORGANIZED HEALTH CARE EDUCATION/TRAINING PROGRAM

## 2023-02-09 PROCEDURE — 1159F PR MEDICATION LIST DOCUMENTED IN MEDICAL RECORD: ICD-10-PCS | Mod: CPTII,S$GLB,, | Performed by: STUDENT IN AN ORGANIZED HEALTH CARE EDUCATION/TRAINING PROGRAM

## 2023-02-09 PROCEDURE — 3074F SYST BP LT 130 MM HG: CPT | Mod: CPTII,S$GLB,, | Performed by: STUDENT IN AN ORGANIZED HEALTH CARE EDUCATION/TRAINING PROGRAM

## 2023-02-09 PROCEDURE — 3008F PR BODY MASS INDEX (BMI) DOCUMENTED: ICD-10-PCS | Mod: CPTII,S$GLB,, | Performed by: STUDENT IN AN ORGANIZED HEALTH CARE EDUCATION/TRAINING PROGRAM

## 2023-02-09 PROCEDURE — 99385 PREV VISIT NEW AGE 18-39: CPT | Mod: S$GLB,,, | Performed by: STUDENT IN AN ORGANIZED HEALTH CARE EDUCATION/TRAINING PROGRAM

## 2023-02-09 NOTE — PROGRESS NOTES
"SUBJECTIVE     Chief Complaint   Patient presents with    Annual Exam     With breast exam       HPI  Karlee Bennett is a 32 y.o. female with no significant past medical history that presents for establishment of care.    Patient reports that she is doing IVF with [facility] in [place] and this requires a clinical breast exam and UTD pap smear. She is here for the month for Aris Palma.     PAST MEDICAL HISTORY:  Past Medical History:   Diagnosis Date    Sickle cell trait        ALLERGIES AND MEDICATIONS: updated and reviewed.  Review of patient's allergies indicates:   Allergen Reactions    Neosporin [benzalkonium chloride] Dermatitis    Tetracyclines      Current Outpatient Medications   Medication Sig Dispense Refill    dapsone (ACZONE) 7.5 % GlwP       spironolactone (ALDACTONE) 100 MG tablet        No current facility-administered medications for this visit.       ROS  Review of Systems   Constitutional:  Negative for chills, fatigue and fever.   HENT:  Negative for rhinorrhea and sore throat.    Respiratory:  Negative for cough and shortness of breath.    Cardiovascular:  Negative for chest pain and palpitations.   Gastrointestinal:  Negative for constipation, diarrhea, nausea and vomiting.   Genitourinary:  Negative for dysuria.   Musculoskeletal:  Negative for joint swelling.   Skin:  Negative for rash and wound.   Neurological:  Negative for light-headedness and headaches.   Psychiatric/Behavioral:  Negative for dysphoric mood and sleep disturbance. The patient is not nervous/anxious.        OBJECTIVE     Physical Exam  Vitals:    02/09/23 1310   BP: 110/70   Pulse: 83   Resp: 18   Temp: 98.3 °F (36.8 °C)    Body mass index is 24.41 kg/m².  Weight: 64.5 kg (142 lb 3.2 oz)   Height: 5' 4" (162.6 cm)     Physical Exam  Vitals reviewed.   Constitutional:       General: She is not in acute distress.  HENT:      Right Ear: External ear normal.      Left Ear: External ear normal.      Nose: Nose normal.      " Mouth/Throat:      Mouth: Mucous membranes are moist.   Eyes:      Extraocular Movements: Extraocular movements intact.      Conjunctiva/sclera: Conjunctivae normal.      Pupils: Pupils are equal, round, and reactive to light.   Pulmonary:      Effort: Pulmonary effort is normal.   Chest:      Chest wall: No mass, lacerations, deformity, swelling or tenderness.   Breasts:     Gee Score is 5.      Breasts are symmetrical.      Right: Normal. No swelling, bleeding, inverted nipple, mass, nipple discharge, skin change or tenderness.      Left: Normal. No swelling, bleeding, inverted nipple, mass, nipple discharge, skin change or tenderness.   Abdominal:      General: There is no distension.      Palpations: Abdomen is soft.   Musculoskeletal:         General: No swelling. Normal range of motion.      Cervical back: Normal range of motion.   Lymphadenopathy:      Upper Body:      Right upper body: No supraclavicular, axillary or pectoral adenopathy.      Left upper body: No supraclavicular, axillary or pectoral adenopathy.   Skin:     General: Skin is warm and dry.      Findings: No rash.   Neurological:      General: No focal deficit present.      Mental Status: She is alert and oriented to person, place, and time.   Psychiatric:         Mood and Affect: Mood normal.         Behavior: Behavior normal.         Health Maintenance         Date Due Completion Date    Pneumococcal Vaccines (Age 0-64) (1 - PCV) Never done ---    TETANUS VACCINE Never done ---    COVID-19 Vaccine (4 - Booster for Moderna series) 01/07/2022 11/12/2021    Influenza Vaccine (1) 09/01/2022 1/14/2022    Cervical Cancer Screening 02/02/2026 2/2/2021              ASSESSMENT     32 y.o. female with     1. Well woman exam without gynecological exam    2. Breast cancer screening other than mammogram    3. Sickle cell trait        PLAN:     1. Well woman exam without gynecological exam  - Patient is up to date on cervical cancer screening with normal  pap smear and GYN exam 2/2/2021.     2. Breast cancer screening other than mammogram  - Normal clinical breast exam today.     3. Sickle cell trait  - Noted. Pt wishes to pursue IVF and likely preconception genetic testing of embryos. Support provided.        Isabella Kennedy MD  02/09/2023 1:27 PM        Follow up in about 1 year (around 2/9/2024).

## 2023-02-09 NOTE — PROGRESS NOTES
Health Maintenance Due   Topic     Pneumococcal Vaccines (Age 0-64) (1 - PCV) Pt decline    TETANUS VACCINE  Pt decline    COVID-19 Vaccine (4 - Booster for Moderna series) Not offered at this Facility    Influenza Vaccine (1) Pt decline

## 2023-02-13 ENCOUNTER — PATIENT MESSAGE (OUTPATIENT)
Dept: FAMILY MEDICINE | Facility: CLINIC | Age: 33
End: 2023-02-13
Payer: COMMERCIAL
